# Patient Record
Sex: FEMALE | Race: WHITE | NOT HISPANIC OR LATINO | Employment: FULL TIME | URBAN - METROPOLITAN AREA
[De-identification: names, ages, dates, MRNs, and addresses within clinical notes are randomized per-mention and may not be internally consistent; named-entity substitution may affect disease eponyms.]

---

## 2018-04-27 ENCOUNTER — OFFICE VISIT (OUTPATIENT)
Dept: FAMILY MEDICINE | Facility: CLINIC | Age: 38
End: 2018-04-27
Payer: COMMERCIAL

## 2018-04-27 VITALS
OXYGEN SATURATION: 95 % | BODY MASS INDEX: 28.25 KG/M2 | HEART RATE: 67 BPM | HEIGHT: 67 IN | DIASTOLIC BLOOD PRESSURE: 80 MMHG | TEMPERATURE: 98.8 F | SYSTOLIC BLOOD PRESSURE: 133 MMHG | WEIGHT: 180 LBS

## 2018-04-27 DIAGNOSIS — Z00.00 ANNUAL PHYSICAL EXAM: Primary | ICD-10-CM

## 2018-04-27 DIAGNOSIS — Z13.29 SCREENING FOR THYROID DISORDER: ICD-10-CM

## 2018-04-27 DIAGNOSIS — Z13.1 SCREENING FOR DIABETES MELLITUS: ICD-10-CM

## 2018-04-27 DIAGNOSIS — R60.0 PEDAL EDEMA: ICD-10-CM

## 2018-04-27 DIAGNOSIS — F42.8 OTHER OBSESSIVE-COMPULSIVE DISORDERS: ICD-10-CM

## 2018-04-27 DIAGNOSIS — Z13.220 SCREENING FOR CHOLESTEROL LEVEL: ICD-10-CM

## 2018-04-27 LAB
BASOPHILS # BLD AUTO: 0 10E9/L (ref 0–0.2)
BASOPHILS NFR BLD AUTO: 0.5 %
BUN SERPL-MCNC: 9 MG/DL (ref 5–24)
CALCIUM SERPL-MCNC: 8.3 MG/DL (ref 8.5–10.4)
CHLORIDE SERPLBLD-SCNC: 102 MMOL/L (ref 94–109)
CO2 SERPL-SCNC: 26 MMOL/L (ref 20–32)
CREAT SERPL-MCNC: 0.9 MG/DL (ref 0.6–1.3)
DIFFERENTIAL METHOD BLD: NORMAL
EGFR CALCULATED (BLACK REFERENCE): 89.6
EGFR CALCULATED (NON BLACK REFERENCE): 74.1
EOSINOPHIL # BLD AUTO: 0.1 10E9/L (ref 0–0.7)
EOSINOPHIL NFR BLD AUTO: 0.9 %
ERYTHROCYTE [DISTWIDTH] IN BLOOD BY AUTOMATED COUNT: 11.9 % (ref 10–15)
GLUCOSE SERPL-MCNC: 94 MG/DL (ref 60–109)
HCT VFR BLD AUTO: 43.7 % (ref 35–47)
HGB BLD-MCNC: 14.8 G/DL (ref 11.7–15.7)
IMM GRANULOCYTES # BLD: 0 10E9/L (ref 0–0.4)
IMM GRANULOCYTES NFR BLD: 0.2 %
LYMPHOCYTES # BLD AUTO: 2 10E9/L (ref 0.8–5.3)
LYMPHOCYTES NFR BLD AUTO: 33.3 %
MCH RBC QN AUTO: 32.7 PG (ref 26.5–33)
MCHC RBC AUTO-ENTMCNC: 33.9 G/DL (ref 31.5–36.5)
MCV RBC AUTO: 97 FL (ref 78–100)
MONOCYTES # BLD AUTO: 0.5 10E9/L (ref 0–1.3)
MONOCYTES NFR BLD AUTO: 7.7 %
NEUTROPHILS # BLD AUTO: 3.4 10E9/L (ref 1.6–8.3)
NEUTROPHILS NFR BLD AUTO: 57.4 %
NRBC # BLD AUTO: 0 10*3/UL
NRBC BLD AUTO-RTO: 0 /100
PLATELET # BLD AUTO: 343 10E9/L (ref 150–450)
POTASSIUM SERPL-SCNC: 3.6 MMOL/L (ref 3.4–5.3)
RBC # BLD AUTO: 4.53 10E12/L (ref 3.8–5.2)
SODIUM SERPL-SCNC: 140 MMOL/L (ref 133–144)
TSH SERPL DL<=0.005 MIU/L-ACNC: 4.68 MU/L (ref 0.4–4)
WBC # BLD AUTO: 5.9 10E9/L (ref 4–11)

## 2018-04-27 NOTE — NURSING NOTE
"37 year old  Chief Complaint   Patient presents with     Physical     and pap       Blood pressure 133/80, pulse 67, temperature 98.8  F (37.1  C), temperature source Temporal, height 5' 7.13\" (170.5 cm), weight 180 lb (81.6 kg), last menstrual period 04/20/2018, SpO2 95 %. Body mass index is 28.09 kg/(m^2).  Patient Active Problem List   Diagnosis     OCD (obsessive compulsive disorder)     Eczema       Wt Readings from Last 2 Encounters:   04/27/18 180 lb (81.6 kg)   07/03/17 173 lb 0.6 oz (78.5 kg)     BP Readings from Last 3 Encounters:   04/27/18 133/80   07/03/17 119/76   07/19/16 116/75         Current Outpatient Prescriptions   Medication     FLUOXETINE HCL PO     No current facility-administered medications for this visit.        Social History   Substance Use Topics     Smoking status: Former Smoker     Types: Cigarettes     Smokeless tobacco: Never Used      Comment: very very rarely     Alcohol use 2.5 oz/week     5 Standard drinks or equivalent per week      Comment: occasionally       Health Maintenance Due   Topic Date Due     HIV SCREEN (SYSTEM ASSIGNED)  05/13/1996     PHQ-9 Q1 MONTH  06/17/2016     INFLUENZA VACCINE (1) 09/01/2017       Lab Results   Component Value Date    PAP NIL 07/19/2016       Barbara Rivera MA  April 27, 2018 2:15 PM    "

## 2018-04-27 NOTE — PATIENT INSTRUCTIONS
Dermatology Referral:  [facility]:  [phone]:   [facility] Dermatology: [place] [phone] [Petey Roach  [facility]: Dr. Jeri Ba, [phone]  [facility] Dermatology, [phone] [facility] today

## 2018-04-27 NOTE — PROGRESS NOTES
"Katty is a 37 year old female  that presents today for Annual Exam: previously has been seen at [facility] however this is my visit with patient.  HPI:  Chief Concern is:   -  Hx of granuloma angulare - .  New lesion on her left thigh  -  Has noted swelling in low legs that comes and goes for > one year.    -  Hives to possible \"dried apricots\" a few weeks ago and resolved  ROS:  General: worries about swelling in lower legs   Head/Eyes: none  Ears/Nose/Throat: none  Cardiovascular: palitation   Respiratory: none  Gastrointestinal: none  Breast: none  Genitourinary: none  Sexual Function: none  Musculoskeletal: none  Skin mole   Neurological: none  Mental Health anxiety - hand washing, etc.   Endocrine: none  HCM: PAP/HPV negative: 2016;   PROBLEM LIST:    OCD (obsessive compulsive disorder)  Fluoxetine 80 mg from Psychiatrist     Eczema   OB/GYN HISTORY:   Regular menses.  Is considering pregnancy [ would have to have a reversal]  Obstetric History       T0      L0     SAB0   TAB0   Ectopic0   Multiple0   Live Births0       PAST MEDICAL HISTORY:  Past Medical History:   Diagnosis Date     OCD (obsessive compulsive disorder)    Life Style Modifiers:   Tobacco:  reports that she has quit smoking. Her smoking use included Cigarettes. She has never used smokeless tobacco.   Alcohol:  reports that she drinks about 2.5 oz of alcohol per week    Drug use:  reports that she does not use illicit drugs.  Exercise: exercises 6 times a week                  Diet:   PAST SURGICAL HISTORY:  Past Surgical History:   Procedure Laterality Date     ESOPHAGOSCOPY, GASTROSCOPY, DUODENOSCOPY (EGD), COMBINED  2013    Procedure: COMBINED ESOPHAGOSCOPY, GASTROSCOPY, DUODENOSCOPY (EGD);;  Surgeon: Qasim Kennedy MD;  Location:  GI     EXTRACTION(S) DENTAL     FAMILY HISTORY:  Family History   Problem Relation Age of Onset     Skin Cancer Paternal Grandmother      Not Melanoma     Thyroid Cancer " "Paternal Grandmother      Alzheimer Disease Paternal Grandmother      Bladder Cancer Paternal Grandfather      Advanced     Depression Paternal Grandfather      Hyperlipidemia Father      Unknown/Adopted Mother    SOCIAL HISTORY:  Arnie .  -  X 4 years.  [he has 5 children] Two at home [14 and 17]  Social History     Social History     Marital status:      Spouse name: N/A     Number of children: N/A     Years of education: N/A     Social History Main Topics     Smoking status: Former Smoker     Types: Cigarettes     Smokeless tobacco: Never Used      Comment: very very rarely     Alcohol use 2.5 oz/week     5 Standard drinks or equivalent per week      Comment: occasionally     Drug use: No     Sexual activity: Yes     Partners: Male     Birth control/ protection: Other, None      Comment: Partner had vasectomy     Social History Narrative   MEDICATIONS:  No current outpatient prescriptions on file.   ALLERGIES:  Flagyl [imidazole antifungals] and Keflex [cephalexin monohydrate]  VITALS:  /80 (BP Location: Right arm, Patient Position: Chair, Cuff Size: Adult Regular)  Pulse 67  Temp 98.8  F (37.1  C) (Temporal)  Ht 5' 7.13\" (170.5 cm)  Wt 180 lb (81.6 kg)  LMP 04/20/2018 (Exact Date)  SpO2 95%  BMI 28.09 kg/m2  PHYSICAL EXAM:  Constitutional: Well appearing woman in no acute distress.   Psychological: appropriate mood.  Eyes: anicteric, normal extra-ocular movements,  pupils are equal and reactive to light.   Ears, Nose and Throat: tympanic membranes clear,   Neck: No thyroidmegaly. No jugular venous distension, no carotid bruits.  Cardiovascular: regular rate and rhythm, normal S1 and S2, no murmurs, rubs or gallops, peripheral pulses full and symmetric   Respiratory: clear to auscultation, no wheezes or crackles, normal breath sounds.  Breast: Symmetrical without visible distortion or swelling. No masses noted. No nipple inversion, no breast dimpling or puckering. " "Axillary area without masses or lympadenapathy.   Gastrointestinal: positive bowel sounds, nontender, no hepatosplenomegaly, no masses. No guarding or rebound.  Genitourinary: N/A   Musculoskeletal: full range of motion    Skin: scattered mole. Left leg with minimal lump on upper thigh  Neurological: normal gait, no tremor.   Diagnoses and associated orders for this visit:  Annual physical exam        -     PAP/HPV 7/2016 completed        -     TDAP 7/2016             -     Hives from unknown source \"dried apricots\"        -     Advised dermatology consult with early screen  Pedal edema  -     CBC with platelets differential  -     TEDS and discussed minimizing sitting  -     Cardiac work-up if not resolving  Other obsessive-compulsive disorders        -     Fluoxetine 80 mg   Screening for thyroid disorder  -     TSH  Screening for diabetes mellitus  -     Basic Metabolic Panel ([facility])  Screening for cholesterol level  -     Lipid Panel ([facility])  Other orders  -     FLUOXETINE HCL PO; Take 80 mg by mouth      "

## 2018-04-27 NOTE — MR AVS SNAPSHOT
After Visit Summary   4/27/2018    Katty Pavon    MRN: [id number]           Patient Information     Date Of Birth          1980        Visit Information        Provider Department      4/27/2018 2:20 PM Jessy Tillman MD [facility]        Today's Diagnoses     Annual physical exam    -  1    Pedal edema        Other obsessive-compulsive disorders        Screening for thyroid disorder        Screening for diabetes mellitus        Screening for cholesterol level          Care Instructions    Dermatology Referral:  [facility]:  [phone]:   [facility] Dermatology: [place] [phone] [Petey Roach  [facility]: Dr. Jeri Ba, [phone]  [facility] Dermatology, [phone] [facility] today            Follow-ups after your visit        Who to contact     Please call your clinic at [phone] to:    Ask questions about your health    Make or cancel appointments    Discuss your medicines    Learn about your test results    Speak to your doctor            Additional Information About Your Visit        MyChart Information     [email or web address] gives you secure access to your electronic health record. If you see a primary care provider, you can also send messages to your care team and make appointments. If you have questions, please call your primary care clinic.  If you do not have a primary care provider, please call [phone] and they will assist you.      [email or web address] is an electronic gateway that provides easy, online access to your medical records. With [email or web address], you can request a clinic appointment, read your test results, renew a prescription or communicate with your care team.     To access your existing account, please contact your [facility] or call [phone] for assistance.        Care EveryWhere ID     This is your Care EveryWhere ID. This could be used by other organizations to access your [facility]  "records  [id number]        Your Vitals Were     Pulse Temperature Height Last Period Pulse Oximetry BMI (Body Mass Index)    67 98.8  F (37.1  C) (Temporal) 5' 7.13\" (170.5 cm) 04/20/2018 (Exact Date) 95% 28.09 kg/m2       Blood Pressure from Last 3 Encounters:   04/27/18 133/80   07/03/17 119/76   07/19/16 116/75    Weight from Last 3 Encounters:   04/27/18 180 lb (81.6 kg)   07/03/17 173 lb 0.6 oz (78.5 kg)   07/19/16 167 lb (75.8 kg)              We Performed the Following     Basic Metabolic Panel ([facility])     CBC with platelets differential     Lipid Panel ([facility])     [facility]        Primary Care Provider Office Phone # Fax #    Ricardo Arnold [phone]       [address]  [facility] [address]        Equal Access to Services     LOYDA MOLINA : Hadii aad ku hadasho Soomaali, waaxda luqadaha, qaybta kaalmada adeegyada, anahi titus . So [facility] [phone].    ATENCIÓN: Si tyler trimble, tiene a matute disposición servicios gratuitos de asistencia lingüística. Llame al [phone].    We comply with applicable federal civil rights laws and [place] laws. We do not discriminate on the basis of race, color, national origin, age, disability, sex, sexual orientation, or gender identity.            Thank you!     Thank you for choosing [facility]  for your care. Our goal is always to provide you with excellent care. Hearing back from our patients is one way we can continue to improve our services. Please take a few minutes to complete the written survey that you may receive in the mail after your visit with us. Thank you!             Your Updated Medication List - Protect others around you: Learn how to safely use, store and throw away your medicines at www.disposemymeds.org.          This list is accurate as of 4/27/18  3:50 PM.  Always use your most recent med list.                   Brand Name Dispense Instructions for use Diagnosis    FLUOXETINE HCL PO      Take " 80 mg by mouth

## 2018-04-30 LAB
CHOLEST SERPL-MCNC: 271 MG/DL (ref 0–200)
CHOLEST/HDLC SERPL: 4.2 {RATIO} (ref 0–5)
FASTING SPECIMEN: NO
HDLC SERPL-MCNC: 65 MG/DL
LDLC SERPL CALC-MCNC: 167 MG/DL (ref 0–129)
TRIGL SERPL-MCNC: 197 MG/DL (ref 0–150)
VLDL-CHOLESTEROL: 39 (ref 7–32)

## 2018-05-02 DIAGNOSIS — R79.89 ELEVATED TSH: Primary | ICD-10-CM

## 2018-10-29 DIAGNOSIS — R79.89 ELEVATED TSH: ICD-10-CM

## 2018-10-29 LAB
T3FREE SERPL-MCNC: 2.5 PG/ML (ref 2.3–4.2)
T4 FREE SERPL-MCNC: 0.94 NG/DL (ref 0.76–1.46)
TSH SERPL DL<=0.005 MIU/L-ACNC: 6.53 MU/L (ref 0.4–4)

## 2019-10-03 ENCOUNTER — HEALTH MAINTENANCE LETTER (OUTPATIENT)
Age: 39
End: 2019-10-03

## 2019-10-23 ENCOUNTER — TELEPHONE (OUTPATIENT)
Dept: FAMILY MEDICINE | Facility: CLINIC | Age: 39
End: 2019-10-23

## 2019-10-23 NOTE — TELEPHONE ENCOUNTER
Called patient and informed her of last Tdap:    Most Recent Immunizations   Administered Date(s) Administered     TDAP Vaccine (Boostrix) 07/19/2016     She stepped on a abner nail at her home and it did not break the skin but bruised some. No other concerns, all questions answered.     Erin Arciniega RN  10/23/19  10:56 AM

## 2019-10-23 NOTE — TELEPHONE ENCOUNTER
[facility] Call Center    Phone Message    May a detailed message be left on voicemail: yes    Reason for Call: Symptoms or Concerns     If patient has red-flag symptoms, warm transfer to triage line    Current symptom or concern: patient called because she stepped on a nail and is wanting to know when her last tetanus was and if she needs to come in           Action Taken: Message routed to:  [facility]: ARMANI
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
## 2019-12-19 ENCOUNTER — OFFICE VISIT (OUTPATIENT)
Dept: FAMILY MEDICINE | Facility: CLINIC | Age: 39
End: 2019-12-19
Payer: COMMERCIAL

## 2019-12-19 VITALS
DIASTOLIC BLOOD PRESSURE: 77 MMHG | HEART RATE: 60 BPM | OXYGEN SATURATION: 98 % | TEMPERATURE: 97.9 F | SYSTOLIC BLOOD PRESSURE: 116 MMHG | BODY MASS INDEX: 27.78 KG/M2 | RESPIRATION RATE: 16 BRPM | HEIGHT: 67 IN | WEIGHT: 177 LBS

## 2019-12-19 DIAGNOSIS — Z11.3 SCREENING FOR STD (SEXUALLY TRANSMITTED DISEASE): ICD-10-CM

## 2019-12-19 DIAGNOSIS — Z23 FLU VACCINE NEED: ICD-10-CM

## 2019-12-19 DIAGNOSIS — Z00.00 ROUTINE GENERAL MEDICAL EXAMINATION AT A HEALTH CARE FACILITY: Primary | ICD-10-CM

## 2019-12-19 DIAGNOSIS — Z13.220 SCREENING FOR LIPID DISORDERS: ICD-10-CM

## 2019-12-19 DIAGNOSIS — F33.1 MODERATE EPISODE OF RECURRENT MAJOR DEPRESSIVE DISORDER (H): ICD-10-CM

## 2019-12-19 SDOH — HEALTH STABILITY: MENTAL HEALTH: HOW MANY STANDARD DRINKS CONTAINING ALCOHOL DO YOU HAVE ON A TYPICAL DAY?: 1 OR 2

## 2019-12-19 SDOH — HEALTH STABILITY: PHYSICAL HEALTH: ON AVERAGE, HOW MANY MINUTES DO YOU ENGAGE IN EXERCISE AT THIS LEVEL?: 0 MIN

## 2019-12-19 SDOH — HEALTH STABILITY: MENTAL HEALTH
STRESS IS WHEN SOMEONE FEELS TENSE, NERVOUS, ANXIOUS, OR CAN'T SLEEP AT NIGHT BECAUSE THEIR MIND IS TROUBLED. HOW STRESSED ARE YOU?: RATHER MUCH

## 2019-12-19 SDOH — HEALTH STABILITY: PHYSICAL HEALTH: ON AVERAGE, HOW MANY DAYS PER WEEK DO YOU ENGAGE IN MODERATE TO STRENUOUS EXERCISE (LIKE A BRISK WALK)?: 0 DAYS

## 2019-12-19 SDOH — HEALTH STABILITY: MENTAL HEALTH: HOW OFTEN DO YOU HAVE A DRINK CONTAINING ALCOHOL?: 2-3 TIMES A WEEK

## 2019-12-19 SDOH — HEALTH STABILITY: MENTAL HEALTH: HOW OFTEN DO YOU HAVE 6 OR MORE DRINKS ON ONE OCCASION?: NEVER

## 2019-12-19 ASSESSMENT — ANXIETY QUESTIONNAIRES
5. BEING SO RESTLESS THAT IT IS HARD TO SIT STILL: NOT AT ALL
6. BECOMING EASILY ANNOYED OR IRRITABLE: SEVERAL DAYS
7. FEELING AFRAID AS IF SOMETHING AWFUL MIGHT HAPPEN: NOT AT ALL
1. FEELING NERVOUS, ANXIOUS, OR ON EDGE: MORE THAN HALF THE DAYS
IF YOU CHECKED OFF ANY PROBLEMS ON THIS QUESTIONNAIRE, HOW DIFFICULT HAVE THESE PROBLEMS MADE IT FOR YOU TO DO YOUR WORK, TAKE CARE OF THINGS AT HOME, OR GET ALONG WITH OTHER PEOPLE: EXTREMELY DIFFICULT
3. WORRYING TOO MUCH ABOUT DIFFERENT THINGS: NEARLY EVERY DAY
GAD7 TOTAL SCORE: 11
2. NOT BEING ABLE TO STOP OR CONTROL WORRYING: NEARLY EVERY DAY

## 2019-12-19 ASSESSMENT — MIFFLIN-ST. JEOR: SCORE: 1505.62

## 2019-12-19 ASSESSMENT — PATIENT HEALTH QUESTIONNAIRE - PHQ9
5. POOR APPETITE OR OVEREATING: MORE THAN HALF THE DAYS
SUM OF ALL RESPONSES TO PHQ QUESTIONS 1-9: 14

## 2019-12-19 NOTE — NURSING NOTE
"39 year old  Chief Complaint   Patient presents with     Physical     pt has a bump on her left upper thigh for years she would like to have looked.       Blood pressure 116/77, pulse 60, temperature 97.9  F (36.6  C), temperature source Oral, resp. rate 16, height 1.71 m (5' 7.32\"), weight 80.3 kg (177 lb), last menstrual period 12/09/2019, SpO2 98 %, not currently breastfeeding. Body mass index is 27.46 kg/m .  Patient Active Problem List   Diagnosis     OCD (obsessive compulsive disorder)     Eczema       Wt Readings from Last 2 Encounters:   12/19/19 80.3 kg (177 lb)   04/27/18 81.6 kg (180 lb)     BP Readings from Last 3 Encounters:   12/19/19 116/77   04/27/18 133/80   07/03/17 119/76         Current Outpatient Medications   Medication     VITAMIN D PO     FLUOXETINE HCL PO     No current facility-administered medications for this visit.        Social History     Tobacco Use     Smoking status: Former Smoker     Types: Cigarettes     Smokeless tobacco: Never Used     Tobacco comment: very very rarely   Substance Use Topics     Alcohol use: Yes     Alcohol/week: 4.2 standard drinks     Types: 5 Standard drinks or equivalent per week     Comment: 5 drinks a week     Drug use: No       Health Maintenance Due   Topic Date Due     HIV SCREENING  05/13/1993     PHQ-9  06/17/2016     PHQ-2  01/01/2019       Lab Results   Component Value Date    PAP NIL 07/19/2016 December 19, 2019 1:18 PM  " No pertinent past medical history

## 2019-12-20 ASSESSMENT — ANXIETY QUESTIONNAIRES: GAD7 TOTAL SCORE: 11

## 2019-12-27 PROBLEM — F33.42 RECURRENT MAJOR DEPRESSIVE DISORDER, IN FULL REMISSION (H): Status: ACTIVE | Noted: 2019-12-27

## 2019-12-27 RX ORDER — LANOLIN ALCOHOL/MO/W.PET/CERES
CREAM (GRAM) TOPICAL
COMMUNITY
Start: 2019-01-07 | End: 2021-09-11

## 2020-08-21 ENCOUNTER — TELEPHONE (OUTPATIENT)
Dept: BEHAVIORAL HEALTH | Facility: CLINIC | Age: 40
End: 2020-08-21

## 2020-08-21 NOTE — TELEPHONE ENCOUNTER
8/21/20 Received call from Pt requesting a DA for [facility] [place]. Scheduled for 8/25/20 with Lorraine Haines. NAMRATAT

## 2020-08-25 ENCOUNTER — HOSPITAL ENCOUNTER (OUTPATIENT)
Dept: BEHAVIORAL HEALTH | Facility: CLINIC | Age: 40
Discharge: HOME | End: 2020-08-25
Attending: PSYCHIATRY & NEUROLOGY | Admitting: PSYCHIATRY & NEUROLOGY
Payer: COMMERCIAL

## 2020-08-25 PROCEDURE — 90791 PSYCH DIAGNOSTIC EVALUATION: CPT | Mod: 95 | Performed by: PSYCHOLOGIST

## 2020-08-25 NOTE — TELEPHONE ENCOUNTER
Writer tried to get in touch with patient to check them in for their MH eval today, no answer so a vm was left for patient to return call to check in. Will try to call again.

## 2020-08-25 NOTE — PROGRESS NOTES
"[facility]  Evaluator Name:  Lorraine Haines      Credentials:  PSYD DEBORA    PATIENT'S NAME: Katty Pavon  PREFERRED NAME: Katty  PREFERRED PRONOUNS: She/Her/Herself  MRN:   [id number]  :   1980   ACCT. NUMBER: [id number]  DATE OF SERVICE: 20  START TIME: 1330  END TIME: 1500  PREFERRED PHONE: [phone]  May we leave a program related message: Yes  Service Modality:  Video Visit:    Telemedicine Visit: The patient's condition can be safely assessed and treated via synchronous audio and visual telemedicine encounter.      Reason for Telemedicine Visit: Services only offered telehealth    Originating Site (Patient Location): Patient's home    Distant Site (Provider Location): Provider Remote Setting    Consent:  The patient/guardian has verbally consented to: the potential risks and benefits of telemedicine (video visit) versus in person care; bill my insurance or make self-payment for services provided; and responsibility for payment of non-covered services.     Patient would like the video invitation sent by: Send to e-mail at: [email or web address]}     Mode of Communication:  Video Conference via Gl[employer]ach    As the provider I attest to compliance with applicable laws and regulations related to telemedicine.    STANDARD ADULT DIAGNOSTIC ASSESSMENT      Identifying Information:  Patient is a 40 year old, .  The pronoun use throughout this assessment reflects the patient's chosen pronoun.  Patient was referred for an assessment by self.  Patient attended the session alone.     Chief Complaint:   The reason for seeking services at this time is: \"OCD, for contamination \" She said the uptick in OCD symptoms is making anxiety worse and she is more depressed.  The problem(s) began lifelong OCD, urine and feces contamination about being less than or dirty.  She said it has been worse since 2019.  She is not afraid of COVID and does not believe it is making the OCD " "worse. She said she has looked for specific OCD treatment and everyone has a long wait list.  She said she contacted [facility] whose wait is 6-12 weeks, individual providers in the [facility] who specialize in OCD and their not taking new patients.   Patient has attempted to resolve these concerns in the past through Dr. Romano at [facility].  She has a therapist and psychiatry.    She said she and  have done therapy together as well.    Social/Family History:  Patient reported they grew up in [place], [place].  Parents  when she was 3 and they  when patient was 6 years old.  She said both parents remarried but remained good friends. They were raised by biological parents.  She has one sister. Mother has OCD but has never gotten help. She said \"I lived in a museum\".  She said her mother is a therapist.  She said her father has NAOMI and is being treated.   Patient reported that her childhood; she modulated for her parents. She had to manage mom's OCD.  Patient described their current relationships with family of origin as good with parents now. She said she talks to them regularly.  9,11,15,31, 42 depressive episodes    The patient describes their cultural background as .  Cultural influences and impact on patient's life structure, values, norms, and healthcare: Racial or Ethnic Self-Identification Orthodoxy but agnostic.  Contextual influences on patient's health include: Family Factors intense, high expectations.    These factors will be addressed in the Preliminary Treatment plan.  Patient identified their preferred language to be English. Patient reported they does not need the assistance of an  or other support involved in therapy.     Patient reported had no significant delays in developmental tasks.   Patient's highest education level was graduate school. Law School  Patient identified the following learning problems: none reported.  " Modifications will not be used to assist communication in therapy.   Patient reports they are  able to understand written materials.    Patient reported the following relationship history  1x  Patient's current relationship status is  for 7 years.  was  1x prior.  She said the marrriage is stressful due to all of these issues with exwife.  She said  has his own issues with mental health and substance use. She said they had 10 years of custody battles and excessive litigation on exwife's part.  She said they did get custody of kids 5 years ago.  She said she recently empty nested out of raising the kids.   Patient identified their sexual orientation as heterosexual.  Patient reported having 5 stepchildren. Patient identified partner, parents, friends, therapist and co-worker as part of their support system.  Patient identified the quality of these relationships as stable and meaningful.      Patient's current living/housing situation involves staying in own home/apartment.  They live with  and they report that housing is stable.     Patient is currently employed full time and reports they are able to function appropriately at work.. She said she can be functional but it is very difficult.  She will not take care of herself to get her work done.  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   Patient reports their finances are obtained through employment.  She said her  is a massage therapist and his work has suffered due to COVID.  Patient does not identify finances as a current stressor.  She said they just recently got out of financial stress due to custody litigation.    Patient reported that they have been involved with the legal system.  Custody litigation with 's exwife because she files for more support.  Patient denies being on probation / parole / under the jurisdiction of the court.      Patient's Strengths and  Limitations:  Patient identified the following strengths or resources that will help them succeed in treatment: commitment to health and well being, friends / good social support, family support, insight and intelligence. Things that may interfere with the patient's success in treatment include: work.   _______________________________________________  Personal and Family Medical History:   Patient did not report a family history of mental health concerns.  Patient reports family history includes Alzheimer Disease in her paternal grandmother; Anxiety Disorder in her father; Bladder Cancer in her paternal grandfather; Depression in her paternal grandfather; Hyperlipidemia in her father; Obesity in her father; Skin Cancer in her paternal grandmother; Thyroid Cancer in her paternal grandmother; Unknown/Adopted in her mother..     Patient reported the following previous diagnoses which include(s): an Anxiety Disorder, Depression and Obsessive Compulsive Disorder.  Patient reported symptoms began childhood.   Patient has received mental health services in the past: psychiatry with Walter Romano. .  Psychiatric Hospitalizations: None.  Patient denies a history of civil commitment.  Currently, patient is receiving other mental health services.  These include psychotherapy with Anneliese Trujillo and psychiatry with Dr. Griffith.  Next appointment: 2 weeks.   Patient has had a physical exam to rule out medical causes for current symptoms.  Date of last physical exam was within the past year. Client was encouraged to follow up with PCP if symptoms were to develop. The patient has a non-[facility] Primary Care Provider. Their PCP is [facility].  MATILDE Chavira..  Patient reports no current medical concerns.  There are significant appetite / nutritional concerns / weight changes. She said she has a weak stomach as a function of her anxiety.  Patient does not report a history of head injury / trauma / cognitive impairment.       Patient reports current meds as:   Outpatient Medications Marked as Taking for the 8/25/20 encounter (Hospital Encounter) with LorraineYancy LP   Medication Sig     ALPRAZolam (XANAX) 1 MG tablet Take 1 mg by mouth 3 times daily as needed for anxiety     fluvoxaMINE (LUVOX) 50 MG tablet Take 50 mg by mouth At Bedtime     VITAMIN D PO Take 2,000 Units by mouth       Medication Adherence:  Patient reports taking prescribed medications as prescribed.    Patient Allergies:    Allergies   Allergen Reactions     Apricot Flavor Hives     Flagyl [Metronidazole] Hives     Keflex [Cephalexin Monohydrate] Hives       Medical History:    Past Medical History:   Diagnosis Date     Anxiety      Depression      OCD (obsessive compulsive disorder)          Current Mental Status Exam:   Appearance:  Appropriate    Eye Contact:  Good   Psychomotor:  Normal       Gait / station:  no problem  Attitude / Demeanor: Cooperative   Speech      Rate / Production: Normal/ Responsive      Volume:  Normal  volume      Language:  no problems  Mood:   Anxious   Affect:   Worrisome    Thought Content: Clear   Thought Process: Coherent  Goal Directed       Associations: No loosening of associations  Insight:   Good   Judgment:  Intact   Orientation:  All  Attention/concentration: Good    Rating Scales:    PHQ9:    PHQ-9 SCORE 5/17/2016 12/19/2019 8/25/2020   PHQ-9 Total Score - - -   PHQ-9 Total Score MyChart - - 19 (Moderately severe depression)   PHQ-9 Total Score 14 14 19   ;    GAD7:    NAOMI-7 SCORE 5/17/2016 12/19/2019 8/25/2020   Total Score - - 14 (moderate anxiety)   Total Score 9 11 14     CGI:     First:Considering your total clinical experience with this particular patient population, how severe are the patient's symptoms at this time?: 5 (8/25/2020  1:39 PM)  ;    Most recentCompared to the patient's condition at the START of treatment, this patient's condition is: 4 (8/25/2020  1:39 PM)      Substance Use:  Patient did not report  a family history of substance use concerns; see medical history section for details.  Patient has not received chemical dependency treatment in the past.  Patient has not ever been to detox.      Patient is not currently receiving any chemical dependency treatment. Patient reported the following problems as a result of their substance use: none identified.    Patient reports occasional use of alcohol.  She said she does not abuse alcohol because it makes her stomach worse which feeds into the OCD.  Patient denies using tobacco.  Patient denies using marijuana.  Patient reports she drinks 1-2 cups of coffe, stops at noon  Patient reports using/abusing the following substance(s). Patient reported no other substance use.     CAGE- AID:    CAGE-AID Total Score 12/6/2013   Total Score 0   1 No  2 No  3. No  4. No    Substance Use: No symptoms    Based on the negative CAGE score and clinical interview there  are not indications of drug or alcohol abuse.    Significant Losses / Trauma / Abuse / Neglect Issues:   Patient did not serve in the .  There are indications or report of significant loss, trauma, abuse or neglect issues related to:  She reports no direct trauma.  She said that she had problems raising her stepchildren and feels she had secondary trauma due to their issues.   Concerns for possible neglect are not present.     Safety Assessment:  She said at times she sees no point in life but would never hurt herself.  She said she has no thoughts or plans to hurt herself.  No previous attempts  Current Safety Concerns:  [place] Suicide Severity Rating Scale (Short Version)  [place] Suicide Severity Rating (Short Version) 8/25/2020   Over the past 2 weeks have you felt down, depressed, or hopeless? no   Over the past 2 weeks have you had thoughts of killing yourself? no   Have you ever attempted to kill yourself? no     Patient denies current homicidal ideation and behaviors.  Patient denies current  self-injurious ideation and behaviors.    Patient denied risk behaviors associated with substance use.  Patient denies any high risk behaviors associated with mental health symptoms.  Patient reports the following current concerns for their personal safety: None.  Patient reports there are no  firearms in the house.     History of Safety Concerns:  Patient denied a history of homicidal ideation.     Patient denied a history of personal safety concerns.    Patient denied a history of assaultive behaviors.    Patient denied a history of sexual assault behaviors.     Patient denied a history of risk behaviors associated with substance use.  Patient denies any history of high risk behaviors associated with mental health symptoms.  Patient reports the following protective factors: dedication to family/friends, safe and stable environment, secure attachment, adherence with prescribed medication, living with other people and daily obligations    Risk Plan:  See Preliminary Treatment Plan for Safety and Risk Management Plan    Review of Symptoms per patient report:  Depression: Change in sleep, Change in energy level, Difficulties concentrating, Feelings of hopelessness, Feelings of helplessness, Low self-worth, Ruminations and Feeling sad, down, or depressed  Sihla:  No Symptoms  Psychosis: No Symptoms  Anxiety: Excessive worry, Nervousness, Physical complaints, such as headaches, stomachaches, muscle tension, Ruminations, Poor concentration and Irritability  Panic:  has had panic attacks.  Wakies up every morning with palpitations and feeling hot.  She said she has had times when she has had panic attacks; not feeling grounded cannot feel her body.  Post Traumatic Stress Disorder:  No Symptoms   Eating Disorder: No Symptoms  ADD / ADHD:  No symptoms  Conduct Disorder: No symptoms  Autism Spectrum Disorder: No symptoms  Obsessive Compulsive Disorder: contamination fears, must shower,  spending approximately 3 hours per  day.  Hand washing, showering  contamination limited to urine and feces  She said she is avoiding things that may require her to take a shower after because she has a hard time getting out of the shower.    Patient reports the following compulsive behaviors and treatment history: none identified.      Diagnostic Criteria:   A. Excessive anxiety and worry about a number of events or activities (such as work or school performance).   B. The person finds it difficult to control the worry.  C. Select 3 or more symptoms (required for diagnosis). Only one item is required in children.   - Being easily fatigued.    - Difficulty concentrating or mind going blank.    - Irritability.    - Muscle tension.    - Sleep disturbance (difficulty falling or staying asleep, or restless unsatisfying sleep).   D. The focus of the anxiety and worry is not confined to features of an Axis I disorder.  E. The anxiety, worry, or physical symptoms cause clinically significant distress or impairment in social, occupational, or other important areas of functioning.   F. The disturbance is not due to the direct physiological effects of a substance (e.g., a drug of abuse, a medication) or a general medical condition (e.g., hyperthyroidism) and does not occur exclusively during a Mood Disorder, a Psychotic Disorder, or a Pervasive Developmental Disorder.  CRITERIA (A-C) REPRESENT A MAJOR DEPRESSIVE EPISODE - SELECT THESE CRITERIA  A) Single episode - symptoms have been present during the same 2-week period and represent a change from previous functioning 5 or more symptoms (required for diagnosis)   - Depressed mood. Note: In children and adolescents, can be irritable mood.     - Increased sleep.    - Psychomotor activity agitation.    - Fatigue or loss of energy.    - Feelings of worthlessness or inappropriate guilt.    - Diminished ability to think or concentrate, or indecisiveness.   B) The symptoms cause clinically significant distress or  impairment in social, occupational, or other important areas of functioning  C) The episode is not attributable to the physiological effects of a substance or to another medical condition  D) The occurence of major depressive episode is not better explained by other thought / psychotic disorders  E) There has never been a manic episode or hypomanic episode    (1) recurrent and persistent thoughts, impulses, or images that are experienced, at some time during the disturbance, as intrusive and inappropriate and that cause marked anxiety or distress     (2) the thoughts, impulses, or images are not simply excessive worries about real-life problems     (3) the client attempts to ignore or suppress such thoughts, impulses, or images, or to neutralize them with some other thought or action     (4) the client recognizes that the obsessional thoughts, impulses, or images are a product of his or her own mind (not imposed from without as in thought insertion)     (1) repetitive behaviors (e.g., hand washing, ordering, checking) or mental acts (e.g., praying, counting, repeating words silently) that the person feels driven to perform in response to an obsession, or according to rules that must be applied rigidly     (2) the behaviors or mental acts are aimed at preventing or reducing distress or preventing some dreaded event or situation; however, these behaviors or mental acts either are not connected in a realistic way with what they are designed to neutralize or prevent or are clearly excessive   At some point during the course of the disorder, the person has recognized that the obsessions or compulsions are excessive or unreasonable  The obsessions or compulsions cause marked distress, are time consuming (take more than 1 hour a day), or significantly interfere with the person's normal routine, occupational (or academic) functioning, or usual social activities or relationships.   The content of the obsessions or  compulsions are not restricted to another Axis I Disorder (e.g., preoccupation with food in the presence of an Eating Disorders; hair pulling in the presence of Trichotillomania; concern with appearance in the presence of Body Dysmorphic Disorder; preoccupation with drugs in the presence of a Substance Use Disorder; preoccupation with having a serious illness in the presence of Hypochondriasis; preoccupation with sexual urges or fantasies in the presence of a Paraphilia; or guilty ruminations in the presence of Major Depressive Disorder).   The disturbance is not due to the direct physiological effects of a substance (e.g., a drug of abuse, a medication) or a general medical condition    Functional Status:  Patient reports the following functional impairments: chronic disease management, health maintenance, management of the household and or completion of tasks, self-care and social interactions.     WHODAS:   WHODAS 2.0 Total Score 2020   Total Score 22       LOCUS Worksheet     Name: Katty Pavon MRN: [id number]    : 1980      Gender:  female    PMI:  HP   Provider Name: [facility]   Provider NPI:  [id number]    Actual level of Care Provided:  Outpatient therapy and psychiatry    Service(s) receiving or referred to:  Adult Day Treatment    Reason for Variance: increase in symptom distress      Rating completed by: Lorraine Haines PSYD, LP      I. Risk of Harm:   1      Minimal Risk of Harm    II. Functional Status:   4      Serious Impairment    III. Co-Morbidity:   1      No Co-Morbidity    IV - A. Recovery Environment - Level of Stress:   3      Moderately Stress Environment    IV - B. Recovery Environment - Level of Support:   3      Limited Support in Environment    V. Treatment and Recovery History:   4      Poor Response to Treatment and Recovery Management    VI. Engagement and Recovery Project:   2      Positive Engagement and Recovery       18 Composite Score    Level of Care  Recommendation:   17 to 19       High Intensity Community Based Services    Clinical Summary:  1. Reason for assessment: Patient reports increased OCD symptoms increasing anxiety and depression symptoms. She is looking for more support and structure while working to obtain more specific OCD treatment .  2. Psychosocial, Cultural and Contextual Factors: Patient reports she is Hindu but agnostic.  She said her clifford's wife is continually litigious and they have been in court every few months regarding custody or support.  She tends to appear that she is functioning well even when symptoms are high .  3. Principal DSM5 Diagnoses  (Sustained by DSM5 Criteria Listed Above):   300.02 (F41.1) Generalized Anxiety Disorder  300.3 (F42) Obsessive Compulsive Disorder.  4. Other Diagnoses that is relevant to services:   None current  5. Provisional Diagnosis:  296.22 (F32.1)  Major Depressive Disorder, Single Episode, Moderate _ and With anxious distress as evidenced by depression symptoms started 2 weeks ago, may be more a function of anxiety and OCD rather than a separate diagnoses.  Further assessment of symptoms during treatment would clarify diagnosis.  6. Prognosis: Expect Improvement.  7. Likely consequences of symptoms if not treated: Without treatment patient more than likely will experience a continuation of symptoms with decreased daily functioning, requiring an increased level of care.  8. Client strengths include:  creative, educated, employed, has a previous history of therapy, intelligent, motivated, open to learning and open to suggestions / feedback .     Recommendations:     1. Plan for Safety and Risk Management:Recommended that patient call 911 or go to the local ED should there be a change in any of these risk factors..  Report to child / adult protection services was NA.     2. Patient identified no cultural or spiritual concerns for treatment services. Patient encouraged to ask for help should needs  arise in the course of treatment.      3. Initial Treatment will focus on: Anxiety - structure and problem solving to lessen mood symptoms.     4. Resources/Service Plan:       services are not indicated.     Modifications to assist communication are not indicated.     Additional disability accommodations are not indicated.      5. Collaboration:  Collaboration / coordination of treatment will be initiated with the following support professionals: outpatient therapist and psychiatry. Writer referred to psychiatry clinic for Dr. Ashley and team for specific OCD services.     6.  Referrals:  The following referral(s) will be initiated: Day Treatment. Next Scheduled Appointment: 09.03.20.  A Release of Information has been obtained for the following: outpatient therapist, psychiatry and emergency contact.    7. GABRIELA: GABRIELA:  Discussed the general effects of drugs and alcohol on health and well-being.     8. Records were not available for review at time of assessment.  Information in this assessment was obtained from the medical record and provided by patient who is a good historian.   Patient will have open access to their mental health medical record.      Eval type:  Mental Health    Staff Name/Credentials:  Lorraine Haines PSYD, LP  August 25, 2020

## 2020-08-26 RX ORDER — ALPRAZOLAM 1 MG
1 TABLET ORAL 3 TIMES DAILY PRN
COMMUNITY
End: 2021-09-11

## 2020-08-26 RX ORDER — FLUVOXAMINE MALEATE 50 MG
200 TABLET ORAL AT BEDTIME
COMMUNITY
End: 2021-09-11

## 2020-09-02 NOTE — PROGRESS NOTES
Pt called and said she would not be coming to the outpatient program.  She said she was able to leave a message for the psychiatry clinic and is on a wait list for [facility].  She said she did talk to her therapist and has decided for now that she will pursue the other options.  If needed in the future she will call back for admittance to day treatment.   Lorraine Haines PSYD, LP

## 2020-09-04 ENCOUNTER — OFFICE VISIT (OUTPATIENT)
Dept: PSYCHIATRY | Facility: CLINIC | Age: 40
End: 2020-09-04
Payer: COMMERCIAL

## 2020-09-04 DIAGNOSIS — F42.9 OBSESSIVE-COMPULSIVE DISORDER, UNSPECIFIED TYPE: Primary | ICD-10-CM

## 2020-09-04 DIAGNOSIS — F42.2 MIXED OBSESSIONAL THOUGHTS AND ACTS: ICD-10-CM

## 2020-09-11 ENCOUNTER — VIRTUAL VISIT (OUTPATIENT)
Dept: PSYCHIATRY | Facility: CLINIC | Age: 40
End: 2020-09-11
Attending: PSYCHOLOGIST
Payer: COMMERCIAL

## 2020-09-11 DIAGNOSIS — F42.2 MIXED OBSESSIONAL THOUGHTS AND ACTS: Primary | ICD-10-CM

## 2020-09-11 DIAGNOSIS — F42.2 MIXED OBSESSIONAL THOUGHTS AND ACTS: ICD-10-CM

## 2020-09-13 NOTE — PROGRESS NOTES
"OUTPATIENT PSYCHOTHERAPY PROGRESS NOTE    Client Name: Katty Pavon   YOB: 1980 (40 year old)   Date of Service:  Sep 11, 2020  Time of Service: 2:00 PM to 3:07PM (67 minutes)  Service Type(s): Therapy     Type of service: Telemedicine Psychotherapy  Reason for Telemedicine Visit: COVID-19 public health recommendations on in-person sessions  Mode of transmission: Secure real time interactive audio and visual telecommunication system (videoconference via Zoom)  Location of originating and distant sites:    Originating site (patient location): patient home    Distant site (provider site): HIPAA compliant location within provider home/remote setting  Telemedicine Visit: The patient's condition can be safely assessed and treated via synchronous audio and visual telemedicine encounter.    Patient has agreed to receiving services via telemedicine technology.    Diagnoses:   Encounter Diagnoses   Name Primary?     OCD (obsessive compulsive disorder)      Mixed obsessional thoughts and acts Yes       Individuals Present:   Patient attended alone    Treatment goal(s) being addressed:   To reduce obsessions surrounding fears of contamination from urine and feces that lead to pronounced cleaning rituals. In addition, the patient has frequent bouts of anxiety each morning that are accompanied by heart palpitations. While these are often the result of lingering anxieties about showering or other contamination related fears, the patient still has these \"anxiety attacks\" even when there isn't something she is explicitly anxious about.     Subjective:    The patient stated that her luvox has been helping with her anxiety, noting that it may just be a placebo effect. She noted that her contamination anxieties had been triggered the previous Saturday by a young boy that frequently defecates in his pants. Specifically, the boy sat on her couch, and while she knew that he had not had an accident that day, the " "patient still had pronounced anxiety and has avoided that couch since the incident occurred. Furthermore, the patient had a long shower (3.5 hours), though she stopped about half way through \"to take a break\". Her  reportedly helped her to calm down. The patient also has a new contamination fear: \"poop bunnies\". These are dust bunnies that are present in her downstairs bathroom, which she fears are contaminated with feces. As a result, she has been actively avoiding the downstairs bathroom. Notably, the patient has been smoking excessively, as much as an entire pack each day.         Treatment:     The patient's ritual log for the past week was reviewed, in addition to the YBOCS she had completed. A detailed accounting of all of her obsessions and rituals was conducted, including those that she no longer endorses (e.g. obsessions surrounding madelin diseases).    Assessment and Progress:     Patient arrived on time to the appointment and was casually dressed and appropriately groomed. She apologized for her appearance - she was wearing sweatpants and a sweatshirt. Attention and concentration were within normal boundaries. Speech was fluent, with normal prosody. Thought content was linear and goal-directed. No indication of unusual beliefs or perception. Patient was forthcoming, pleasant and cooperative. The patient was able to complete her ritual log, in addition to the YBOCS symptom checklist, and symptom severity measure.         Plan:     For homework, the patient was instructed to complete a thought record around each inciting incident of contamination anxiety. Specifically, the patient was told to complete a thought record around using the downstairs bathroom, sitting on her couch and obsessions and rituals  around showering. She was told to continue to fill out a weekly ritual log. She was instructed to use her downstairs bathroom at least once per day, to spend 20 minutes each day sitting on the " area of the couch where the young boy had sat, and to refrain from washing her hands after she smoked a cigarette.         Sheng Gutierrez  Practicum Student

## 2020-09-16 NOTE — PROGRESS NOTES
I (Kole Ashley, Ph.D., ) reviewed this note and agree with its contents.  I was present during the key elements of this evaluation.  This session will be discussed in supervision before being seen here again for psychotherapy.

## 2020-09-16 NOTE — PROGRESS NOTES
I (Kole Ashley, Ph.D., ) reviewed this note and agree with its contents.  I did not staff this session in person.  This session will be discussed in supervision before being seen here again for psychotherapy.

## 2020-09-18 ENCOUNTER — VIRTUAL VISIT (OUTPATIENT)
Dept: PSYCHIATRY | Facility: CLINIC | Age: 40
End: 2020-09-18
Attending: PSYCHOLOGIST
Payer: COMMERCIAL

## 2020-09-18 DIAGNOSIS — F42.9 OBSESSIVE-COMPULSIVE DISORDER, UNSPECIFIED TYPE: Primary | ICD-10-CM

## 2020-09-18 DIAGNOSIS — F42.2 MIXED OBSESSIONAL THOUGHTS AND ACTS: ICD-10-CM

## 2020-09-24 NOTE — PROGRESS NOTES
"OUTPATIENT PSYCHOTHERAPY PROGRESS NOTE    Client Name: Katty Pavon   YOB: 1980 (40 year old)   Date of Service:  Sep 18, 2020  Time of Service: 2:00 PM to 3:04PM (64 minutes)  Service Type(s): Therapy     Type of service: Telemedicine Psychotherapy  Reason for Telemedicine Visit: COVID-19 public health recommendations on in-person sessions  Mode of transmission: Secure real time interactive audio and visual telecommunication system (videoconference via Zoom)  Location of originating and distant sites:    Originating site (patient location): patient home    Distant site (provider site): HIPAA compliant location within provider home/remote setting  Telemedicine Visit: The patient's condition can be safely assessed and treated via synchronous audio and visual telemedicine encounter.    Patient has agreed to receiving services via telemedicine technology.    Diagnoses:   Encounter Diagnoses   Name Primary?     Obsessive-compulsive disorder, unspecified type Yes     Mixed obsessional thoughts and acts        Individuals Present:   Patient attended alone    Treatment goal(s) being addressed:   To reduce obsessions surrounding fears of contamination from urine and feces that lead to pronounced cleaning rituals. In addition, the patient has frequent bouts of anxiety each morning that are accompanied by heart palpitations. While these are often the result of lingering anxieties about showering or other contamination related fears, the patient still has these \"anxiety attacks\" even when there isn't something she is explicitly anxious about.     Subjective:    The patient continued to state that her luvox has been helping with her anxiety, noting that it may just be a placebo effect. However, while certain aspects of the anxiety had been better, the patient endorsed somatic symptoms of anxiety, namely heart palpitations in the morning. In general, she endorsed a decrease of 50% in anxiety symptoms. Her " "contamination anxiety was triggered by a visit to her parents house; the patient had diarrhea. The resultant \"splashing\" made her feel extremely contaminated. The patient was able to shower and to keep it at approximately 20 minutes, while an average shower has been 45 minutes during the COVID-19 pandemic. The patient also stated that during showers she washes her hair twice, and will \"march\" in the shower (I.e. raise her feet up and down) to alleviate the anxiety around feeling \"not clean enough\", a clear example of a ritual in response to the contamination fears. In addition, prior to showering, the patient will clean the area where she will be sitting afterwards to avoid contamination after she is clean.      Treatment:     The patient's ritual log for the past week was reviewed, detailed accounting of all of her obsessions and rituals was conducted, including those that she no longer endorses (e.g. obsessions surrounding madelin diseases).    Assessment and Progress:     Patient arrived on time to the appointment and was casually dressed and appropriately groomed. She apologized for her appearance - she was wearing sweatpants and a sweatshirt, which she has done for every single appointment so far. Attention and concentration were within normal boundaries. Speech was fluent, with normal prosody. Thought content was linear and goal-directed. No indication of unusual beliefs or perception. Patient was forthcoming, pleasant and cooperative. The patient was able to complete her ritual log and thought record, and was particularly thorough about the thought record.        Plan:     For homework, the patient was instructed to shower every other day with a time limit of 15 minutes. She was told to time each shower and record its duration.  She was instructed to continue to fill out a weekly ritual log. The patient stated that she would be at a cabin from 9/19 to 9/23. Upon her return, the patient was instructed to " "only use the bathroom with the \"kamikaze bunnies\" (dust bunnies contaminated with fecal matter).      Sheng Gutierrez  Practicum Student      "

## 2020-09-25 ENCOUNTER — VIRTUAL VISIT (OUTPATIENT)
Dept: PSYCHIATRY | Facility: CLINIC | Age: 40
End: 2020-09-25
Attending: PSYCHOLOGIST
Payer: COMMERCIAL

## 2020-09-25 DIAGNOSIS — F42.2 MIXED OBSESSIONAL THOUGHTS AND ACTS: ICD-10-CM

## 2020-09-25 DIAGNOSIS — F42.9 OBSESSIVE-COMPULSIVE DISORDER, UNSPECIFIED TYPE: Primary | ICD-10-CM

## 2020-09-30 NOTE — PROGRESS NOTES
I (Kole Ashley, Ph.D., ) reviewed this note and agree with it contents.  I did not staff this session in person.  This session will be discussed in supervision prior to the patient's next scheduled clinic appointment.

## 2020-10-02 ENCOUNTER — VIRTUAL VISIT (OUTPATIENT)
Dept: PSYCHIATRY | Facility: CLINIC | Age: 40
End: 2020-10-02
Attending: PSYCHOLOGIST
Payer: COMMERCIAL

## 2020-10-02 DIAGNOSIS — F42.9 OBSESSIVE-COMPULSIVE DISORDER, UNSPECIFIED TYPE: Primary | ICD-10-CM

## 2020-10-02 DIAGNOSIS — F42.2 MIXED OBSESSIONAL THOUGHTS AND ACTS: ICD-10-CM

## 2020-10-02 PROCEDURE — 90837 PSYTX W PT 60 MINUTES: CPT | Mod: HN

## 2020-10-04 NOTE — PROGRESS NOTES
"OUTPATIENT PSYCHOTHERAPY PROGRESS NOTE    Client Name: Katty Pavon   YOB: 1980 (40 year old)   Date of Service:  Sep 25, 2020  Time of Service: 2:00 PM to 3:10PM (70 minutes)  Service Type(s): Therapy     Type of service: Telemedicine Psychotherapy  Reason for Telemedicine Visit: COVID-19 public health recommendations on in-person sessions  Mode of transmission: Secure real time interactive audio and visual telecommunication system (videoconference via Zoom)  Location of originating and distant sites:    Originating site (patient location): patient home    Distant site (provider site): HIPAA compliant location within provider home/remote setting  Telemedicine Visit: The patient's condition can be safely assessed and treated via synchronous audio and visual telemedicine encounter.    Patient has agreed to receiving services via telemedicine technology.    Diagnoses:   Encounter Diagnoses   Name Primary?     Obsessive-compulsive disorder, unspecified type Yes     Mixed obsessional thoughts and acts        Individuals Present:   Patient attended alone    Treatment goal(s) being addressed:   To reduce obsessions surrounding fears of contamination from urine and feces that lead to pronounced cleaning rituals. In addition, the patient has frequent bouts of anxiety each morning that are accompanied by heart palpitations. While these are often the result of lingering anxieties about showering or other contamination related fears, the patient still has these \"anxiety attacks\" even when there isn't something she is explicitly anxious about.     Subjective:    The patient continued to state that her luvox has been helping with her anxiety, noting that it may just be a placebo effect. In the initial 30 minutes of the appointment, the patient discussed ongoing stressors in her life. In particular, she discussed marital conflict, as well as ongoing symptoms of panic upon awakening. The patient had been on " "vacation the last week, which she said was very helpful for her mindset, though it had brought up stress related to whether she wanted to remain in her marriage. Specifically, she feels that while her  is capable of empathizing with her OCD symptoms, his lack of understanding about 1) what it is like to experience these symptoms; and 2) how stressful they are for her, leave her with an overall sense that she is not being supported enough by him.       Treatment:     The patient's ritual log for the past week was reviewed, detailed accounting of all of her obsessions and rituals was conducted, including those that she no longer endorses (e.g. obsessions surrounding madelin diseases). HW assigned from the previous week was also reviewed, as well as her thought records.    Assessment and Progress:     Patient arrived on time to the appointment and was casually dressed and appropriately groomed. She apologized for her appearance - she was wearing sweatpants and a sweatshirt, which she has done for every single appointment so far. Attention and concentration were within normal boundaries. Speech was fluent, with normal prosody. Thought content was linear and goal-directed. No indication of unusual beliefs or perception. Patient was forthcoming, pleasant and cooperative. The patient was unable to keep her showers short (30 minutes) due to her being on vacation, but was able to have three successful showers.      Plan:     For homework, the patient was instructed to shower four times this week, keeping each shower to 30 minutes. She was instructed to make note of where she had to restart in the process. In addition, she was told to log the number of times she wipes after having a bowel movement (noting discoloration), to hold \"the kamikaze bunnies\" and to refrain from washing underneath her fingernails.    Sheng Gutierrez  Practicum Student      "

## 2020-10-08 NOTE — PROGRESS NOTES
"OUTPATIENT PSYCHOTHERAPY PROGRESS NOTE    Client Name: Katty Pavon   YOB: 1980 (40 year old)   Date of Service:  Oct 02, 2020  Time of Service: 2:00 PM to 3:05 PM (65 minutes)  Service Type(s): Therapy     Type of service: Telemedicine Psychotherapy  Reason for Telemedicine Visit: COVID-19 public health recommendations on in-person sessions  Mode of transmission: Secure real time interactive audio and visual telecommunication system (videoconference via Zoom)  Location of originating and distant sites:    Originating site (patient location): patient home    Distant site (provider site): HIPAA compliant location within provider home/remote setting  Telemedicine Visit: The patient's condition can be safely assessed and treated via synchronous audio and visual telemedicine encounter.    Patient has agreed to receiving services via telemedicine technology.    Diagnoses:   Encounter Diagnoses   Name Primary?     Obsessive-compulsive disorder, unspecified type Yes     Mixed obsessional thoughts and acts        Individuals Present:   Patient attended alone    Treatment goal(s) being addressed:   To reduce obsessions surrounding fears of contamination from urine and feces that lead to pronounced cleaning rituals. In addition, the patient has frequent bouts of anxiety each morning that are accompanied by heart palpitations. While these are often the result of lingering anxieties about showering or other contamination related fears, the patient still has these \"anxiety attacks\" even when there isn't something she is explicitly anxious about.     Subjective:    Given this was the 5th session (FCI point) or ERP treatment, the patient's progress through treatment was reviewed. She stated that luvox had been tremendously helpful. In addition, she noted that her symptoms of OCD had been very manageable the last week. An exception was the night before session: the patient noticed a stain on her underwear " "that she thought might be poop. As a result, she felt contaminated, sparking a cleaning spree where she bleached the outdoor chairs, and ended up throwing out several blankets and pillows that she had sat on. It also necessitated her taking a longer shower (around 40 minutes) in order to feel clean. Notably, the patient insightfully observed that she would be able to do any amount of exposure related to cleaning or touching the macie bunnies \"as long as I'm able to clean myself afterwards\".     Treatment:     HW assigned from the previous week was also reviewed, as well as her thought records.    Assessment and Progress:     Patient arrived on time to the appointment and was casually dressed and appropriately groomed. She apologized for her appearance - she was wearing sweatpants and a sweatshirt, which she has done for every single appointment so far. Attention and concentration were within normal boundaries. Speech was fluent, with normal prosody. Thought content was linear and goal-directed. No indication of unusual beliefs or perception. Patient was forthcoming, pleasant and cooperative. The patient was mostly able to keep her showers short during the week, and successfully recorded the amount of time (approximately) she spent doing the ritual (I.e. the \"pee dance\" in the shower). The  Patient stated that two of three showers were 30 minutes or less (she did not time precisely, just looked at clock), while the third was lengthened due to the incident of contamination. It appears that the patient performs \"the pee dance\" at least half the time she is showering except for \"when I am washing arms hands and legs\". The patient was unable to limit her flushing due to \"low-flow toilets\".    Plan:     For homework, the patient was instructed to shower four times this week. Instead of the pee dance in the shower, the anti ritual of moving her arms up and down and stating things such as \"I just peed myself\". In " "addition, she was told to go to area of the couch where she felt she was contaminated and say \"I'm covered in poop\", \"I'm spreading the poop\", etc. She was also told to say these things while holding the kamikaze bunnies. The patient was instructed to go to the trash where she had thrown out the pillows and blankets, and hold them in her hands saying \"I\"m contaminated\" over and over.    Sheng Gutierrez  Practicum Student      "

## 2020-10-09 ENCOUNTER — VIRTUAL VISIT (OUTPATIENT)
Dept: PSYCHIATRY | Facility: CLINIC | Age: 40
End: 2020-10-09
Attending: PSYCHOLOGIST
Payer: COMMERCIAL

## 2020-10-09 DIAGNOSIS — F42.9 OBSESSIVE-COMPULSIVE DISORDER, UNSPECIFIED TYPE: Primary | ICD-10-CM

## 2020-10-09 DIAGNOSIS — F42.2 MIXED OBSESSIONAL THOUGHTS AND ACTS: ICD-10-CM

## 2020-10-09 PROCEDURE — 90837 PSYTX W PT 60 MINUTES: CPT | Mod: HN

## 2020-10-12 NOTE — PROGRESS NOTES
"I (Kole Ashley, Ph.D., ) reviewed this note and agree with its contents; however I would clarify that \"kamakazi bunnies\" refers to small balls of dust on the floor of a basement bathroom that the patient fears are contaminated with feces.  I was not present during this session.  This session will be reviewed in supervision prior to the patient's next scheduled clinic appointment.     "

## 2020-10-12 NOTE — PROGRESS NOTES
I (Kole Ashley, Ph.D., ) reviewed this note and agree with its contents.  I was not present during this session.  This session will be reviewed in supervision prior to the patient's next scheduled clinic appointment.

## 2020-10-16 NOTE — PROGRESS NOTES
"OUTPATIENT PSYCHOTHERAPY PROGRESS NOTE    Client Name: Katty Pavon   YOB: 1980 (40 year old)   Date of Service:  Oct 09, 2020  Time of Service: 2:00 PM to 3:02 PM (62 minutes)  Service Type(s): Therapy     Type of service: Telemedicine Psychotherapy  Reason for Telemedicine Visit: COVID-19 public health recommendations on in-person sessions  Mode of transmission: Secure real time interactive audio and visual telecommunication system (videoconference via Zoom)  Location of originating and distant sites:    Originating site (patient location): patient home    Distant site (provider site): HIPAA compliant location within provider home/remote setting  Telemedicine Visit: The patient's condition can be safely assessed and treated via synchronous audio and visual telemedicine encounter.    Patient has agreed to receiving services via telemedicine technology.    Diagnoses:   Encounter Diagnoses   Name Primary?     Obsessive-compulsive disorder, unspecified type Yes     Mixed obsessional thoughts and acts        Individuals Present:   Patient attended alone    Treatment goal(s) being addressed:   To reduce obsessions surrounding fears of contamination from urine and feces that lead to pronounced cleaning rituals. In addition, the patient has frequent bouts of anxiety each morning that are accompanied by heart palpitations. While these are often the result of lingering anxieties about showering or other contamination related fears, the patient still has these \"anxiety attacks\" even when there isn't something she is explicitly anxious about.     Subjective:    She stated that luvox had been tremendously helpful. In addition, she noted that her symptoms in the past week had been drastically reduced. She was able to complete exposures with little difficulty. The patient reported that she was disappointed that her  had not noticed or acknowledged her improvement.     Treatment:     HW assigned from " "the previous week was also reviewed, as well as her thought records.    Assessment and Progress:     Patient arrived on time to the appointment and was casually dressed and appropriately groomed. Attention and concentration were within normal boundaries. Speech was fluent, with normal prosody. Thought content was linear and goal-directed. No indication of unusual beliefs or perception. Patient was forthcoming, pleasant and cooperative.The patient consistently took shorter showers throughout the week: 15 minutes. The only shower that was longer (18 minutes) was because she ran out of shampoo and had to get more. In addition, the patient performed her ritual in the shower (I.e. pee dance) very little (approximately 1/10 of the time). Finally, the patient was able to hold the \"kamikaze bunnies\" (dust bunnies that she believes are infected with feces). The patient rates improvement since the onset of treatment at 75-80 percent.    Plan:     For homework, the patient was instructed to shower four times this week.She was also instructed to keep her feet firmly planted on the ground, and think \"I just peed myself\", every time that she felt contaminated. In addition, she was told to go to area of the couch where she felt she was contaminated and say \"I'm covered in poop\", \"I'm spreading the poop\", etc. She was also told to say these things while holding the kamikaze bunnies. The patient was instructed to go to the trash where she had thrown out the pillows and blankets, and hold them in her hands saying \"I\"m contaminated\" over and over.    Sheng Gutierrez  Practicum Student      "

## 2020-10-30 NOTE — PROGRESS NOTES
I (Kole Ashley, Ph.D., ) reviewed this note and agree with its contents.  I did not staff this session in person.  This session will be reviewed in supervision prior to the patient's next scheduled clinic appointment.

## 2020-11-18 ENCOUNTER — VIRTUAL VISIT (OUTPATIENT)
Dept: GASTROENTEROLOGY | Facility: CLINIC | Age: 40
End: 2020-11-18
Payer: COMMERCIAL

## 2020-11-18 DIAGNOSIS — R11.2 NAUSEA AND VOMITING, INTRACTABILITY OF VOMITING NOT SPECIFIED, UNSPECIFIED VOMITING TYPE: Primary | ICD-10-CM

## 2020-11-18 DIAGNOSIS — R19.7 DIARRHEA, UNSPECIFIED TYPE: ICD-10-CM

## 2020-11-18 DIAGNOSIS — K21.9 GASTROESOPHAGEAL REFLUX DISEASE, UNSPECIFIED WHETHER ESOPHAGITIS PRESENT: ICD-10-CM

## 2020-11-18 PROCEDURE — 99204 OFFICE O/P NEW MOD 45 MIN: CPT | Mod: 95 | Performed by: INTERNAL MEDICINE

## 2020-11-18 RX ORDER — ONDANSETRON 8 MG/1
8 TABLET, ORALLY DISINTEGRATING ORAL EVERY 8 HOURS PRN
Qty: 30 TABLET | Refills: 3 | Status: SHIPPED | OUTPATIENT
Start: 2020-11-18 | End: 2021-09-11

## 2020-11-18 NOTE — PROGRESS NOTES
"Katty Pavon is a 40 year old female who is being evaluated via a billable video visit.      The patient has been notified of following:     \"This video visit will be conducted via a call between you and your physician/provider. We have found that certain health care needs can be provided without the need for an in-person physical exam.  This service lets us provide the care you need with a video conversation.  If a prescription is necessary we can send it directly to your pharmacy.  If lab work is needed we can place an order for that and you can then stop by our lab to have the test done at a later time.    If during the course of the call the physician/provider feels a video visit is not appropriate, you will not be charged for this service.\"     Patient has given verbal consent for Video visit? Yes    Patient would like the video invitation sent by: Send to e-mail at: [email or web address]    Video Start Time:     Katty Pavon complains of    Chief Complaint   Patient presents with     New Patient     Nausea and vomiting, per patient. diarrhea/ weekly episodes        I have reviewed and updated the patient's Past Medical History, Social History, Family History and Medication List.    ALLERGIES  Apricot flavor, Flagyl [metronidazole], and Keflex [cephalexin monohydrate]    Additional provider notes:    Assessment/Plan:        Video-Visit Details    Type of service:  Video Visit    Video End Time (time video stopped):     Originating Location (pt. Location):     Distant Location (provider location):  [facility]     Mode of Communication:  Video Conference via Rosanna Moncada MA      "

## 2020-11-18 NOTE — PATIENT INSTRUCTIONS
Start taking zofran when you begin to feel nauseous to help prevent these vomiting episodes.    Continue to try to avoid lactose - if you want to eat it - take a lactaid pill first.    Continue the fiber supplements to help bulk up the stool - you can increase this to three times a day.

## 2020-11-18 NOTE — PROGRESS NOTES
HPI:    Katty presents today for a video visit to discuss Intermittent episodes of nausea/vomiting/diarrhea which have been going on for years.  Now occur about once a month - used to be much more frequent.  Sometimes she is able to attribute her symptoms to dairy but not all the time.  Does think she is lactose intolerant but still occassionally has dairy.  Had lactaid but hasn't used it regularly.   These symptoms tend to come on with nausea, then a short time later develops vomiting and diarrhea simultaneously.  Has never tried anti-emetics.  Almost immediately after vomiting, she will feel back to normal and will sometimes even feel hungry.  No abdominal pain.  Does have occasional reflux which is controlled with OTC antacids. In between these episodes she otherwise feels fine.   Patient does have OCD and her main trigger is related to having bowel movements and cleanliness after them - has had a few episodes of fecal incontinence during these vomiting/diarrhea episodes which is understandably very distressing for her.    EGD for same symptoms in 2013 normal.    Past Medical History:   Diagnosis Date     Anxiety      Depression      OCD (obsessive compulsive disorder)        Past Surgical History:   Procedure Laterality Date     ESOPHAGOSCOPY, GASTROSCOPY, DUODENOSCOPY (EGD), COMBINED  12/13/2013    Procedure: COMBINED ESOPHAGOSCOPY, GASTROSCOPY, DUODENOSCOPY (EGD);;  Surgeon: Qasim Kennedy MD;  Location:  GI     EXTRACTION(S) DENTAL         Family History   Problem Relation Age of Onset     Skin Cancer Paternal Grandmother         Not Melanoma     Thyroid Cancer Paternal Grandmother      Alzheimer Disease Paternal Grandmother      Bladder Cancer Paternal Grandfather         Advanced     Depression Paternal Grandfather      Hyperlipidemia Father      Anxiety Disorder Father      Obesity Father      Unknown/Adopted Mother        Social History     Tobacco Use     Smoking status: Former Smoker      Types: Cigarettes     Smokeless tobacco: Never Used     Tobacco comment: very very rarely   Substance Use Topics     Alcohol use: Yes     Alcohol/week: 4.2 standard drinks     Types: 5 Standard drinks or equivalent per week     Frequency: 2-3 times a week     Drinks per session: 1 or 2     Binge frequency: Never     Comment: 5 drinks a week        O:    Gen: no acute distress  HEENT: NCAT  Neck: normal ROM  Resp: nonlabored breathing  Neuro: no gross deficits  Psych: appropriate mood and affect    Assessment and Plan:    # intermittent nausea/vomiting/diarrhea - ?food intolerance of some kind.  No abdominal pain to suggest biliary etiology. Abrupt onset and resolution not typical of gastroparesis.  Advised either avoiding lactose or adding lactase enzymes prior to eating things that may contain dairy.  Can also start a fiber supplement to help bulk up the stool.  Will give trial of SL ondansetron for patient to take immediately when she starts to feel nauseated to help abort the episodes.     # ?lactose intolerance - lactase enzyme as above.    # OCD  RTC 3 months    Melida Sweet DO     Video-Visit Details     Type of service:  Video Visit     Video Start Time: 3:00 PM  Video End Time (time video stopped): 3:31 PM    Originating Location (pt. Location): home     Distant Location (provider location):  [facility]      Mode of Communication:  Video Conference via [employer]

## 2020-12-08 ENCOUNTER — OFFICE VISIT (OUTPATIENT)
Dept: DERMATOLOGY | Facility: CLINIC | Age: 40
End: 2020-12-08
Payer: COMMERCIAL

## 2020-12-08 DIAGNOSIS — D22.9 MULTIPLE BENIGN NEVI: ICD-10-CM

## 2020-12-08 DIAGNOSIS — L82.1 SEBORRHEIC KERATOSIS: Primary | ICD-10-CM

## 2020-12-08 DIAGNOSIS — L82.0 INFLAMED SEBORRHEIC KERATOSIS: ICD-10-CM

## 2020-12-08 DIAGNOSIS — L81.4 SOLAR LENTIGO: ICD-10-CM

## 2020-12-08 PROCEDURE — 99202 OFFICE O/P NEW SF 15 MIN: CPT | Mod: 25 | Performed by: DERMATOLOGY

## 2020-12-08 PROCEDURE — 17110 DESTRUCTION B9 LES UP TO 14: CPT | Performed by: DERMATOLOGY

## 2020-12-08 ASSESSMENT — PAIN SCALES - GENERAL: PAINLEVEL: NO PAIN (0)

## 2020-12-08 NOTE — PATIENT INSTRUCTIONS
Cryotherapy    What is it?    Use of a very cold liquid, such as liquid nitrogen, to freeze and destroy abnormal skin cells that need to be removed    What should I expect?    Tenderness and redness    A small blister that might grow and fill with dark purple blood. There may be crusting.    More than one treatment may be needed if the lesions do not go away.    How do I care for the treated area?    Gently wash the area with your hands when bathing.    Use a thin layer of Vaseline to help with healing. You may use a Band-Aid.     The area should heal within 7-10 days and may leave behind a pink or lighter color.     Do not use an antibiotic or Neosporin ointment.     You may take acetaminophen (Tylenol) for pain.     Call your Doctor if you have:    Severe pain    Signs of infection (warmth, redness, cloudy yellow drainage, and or a bad smell)    Questions or concerns    Who should I call with questions?       [facility]: [phone]       [facility]: [phone]       For urgent needs outside of business hours call the [facility] at [phone]        and ask for the dermatology resident on call

## 2020-12-08 NOTE — PROGRESS NOTES
"[facility] Dermatology Note      Dermatology Problem List:  1. ISK. LiqN2. X 2 neck.     CC:   Chief Complaint   Patient presents with     Skin Check     Katty is here today for a skin check. She states \" I had a mole on my forehead that has just went away\"     Encounter Date: Dec 8, 2020    History of Present Illness:  Ms. Katty Pavon is a 40 year old female who with a history of skin cancer presents FBSE. She was initially recommended to have FBSE about a 1-2 years ago for evaluation of a scaly spot on her left forehead near the hairline. The area has since largely resolved. She reports a few similar appearing lesions on the face, as well as \"skin tags\" on the neck area. They are itchy and do rub on jewelry or clothing and would like them removed if possible.     The patient otherwise denies any new or concerning lesions. No bleeding, painful, pruritic, or changing lesions. They report no personal history of skin cancer. There is a family history of skin cancer in her paternal grandfather, grandmother (NMSC). No history of immunosuppression. No history of indoor tanning. They do use sunscreen and protective clothing when outdoors for sun protection. She does burn easily when outdoors without sunscreen. No occupational exposure to ultraviolet light or other forms of radiation. Patient is an  / chief developmental officer. Health otherwise stable. No other skin concerns.       Past Medical History:   Patient Active Problem List   Diagnosis     OCD (obsessive compulsive disorder)     Eczema     Moderate episode of recurrent major depressive disorder (H)     Past Medical History:   Diagnosis Date     Anxiety      Depression      OCD (obsessive compulsive disorder)      Past Surgical History:   Procedure Laterality Date     ESOPHAGOSCOPY, GASTROSCOPY, DUODENOSCOPY (EGD), COMBINED  12/13/2013    Procedure: COMBINED ESOPHAGOSCOPY, GASTROSCOPY, DUODENOSCOPY (EGD);;  Surgeon: Marcia" Qasim Sawant MD;  Location:  GI     EXTRACTION(S) DENTAL         Social History:  Patient reports that she has quit smoking. Her smoking use included cigarettes. She has never used smokeless tobacco. She reports current alcohol use of about 4.2 standard drinks of alcohol per week. She reports that she does not use drugs.    Family History:  Family History   Problem Relation Age of Onset     Skin Cancer Paternal Grandmother         Not Melanoma     Thyroid Cancer Paternal Grandmother      Alzheimer Disease Paternal Grandmother      Bladder Cancer Paternal Grandfather         Advanced     Depression Paternal Grandfather      Hyperlipidemia Father      Anxiety Disorder Father      Obesity Father      Unknown/Adopted Mother        Medications:  Current Outpatient Medications   Medication Sig Dispense Refill     ALPRAZolam (XANAX) 1 MG tablet Take 1 mg by mouth 3 times daily as needed for anxiety       fluvoxaMINE (LUVOX) 50 MG tablet Take 200 mg by mouth At Bedtime        melatonin 3 MG tablet As needed       ondansetron (ZOFRAN-ODT) 8 MG ODT tab Take 1 tablet (8 mg) by mouth every 8 hours as needed for nausea 30 tablet 3     VITAMIN D PO Take 4,000 Units by mouth        FLUOXETINE HCL PO Take 40 mg by mouth        Allergies   Allergen Reactions     Apricot Flavor Hives     Flagyl [Metronidazole] Hives     Keflex [Cephalexin Monohydrate] Hives         Review of Systems:  -As per HPI  -Constitutional: Otherwise feeling well today, in usual state of health.  -Skin: As above in HPI. No additional skin concerns.    Physical exam:  Vitals: There were no vitals taken for this visit.  GEN: This is a well developed, well-nourished female in no acute distress, in a pleasant mood.    SKIN: Full skin, which includes the head/face, both arms, chest, back, abdomen,both legs, genitalia and/or groin buttocks, digits and/or nails, was examined.  -Calle skin type: I/II  -Brown macules on sun exposed areas  -Scattered brown,  waxy, stuck-on appearing papules on face, trunk, and extremities including small lesion on the left forehead near hairline x 1, and two excoriated lesion with peripheral rim of erythema on the right neck x 2.   -Scattered brown macules on papules (50-60 total) on face, trunk, and extremities without concerning dermoscopy features.  -No other lesions of concern on areas examined.     Labs:  None.     Impression/Plan:    1. Seborrheic keratosis, inflamed. R neck x 2.   - Cryotherapy procedure note: After verbal consent and discussion of risks and benefits including but no limited to dyspigmentation/scar, blister, and pain, 2 lesions were treated with 1-2mm freeze border for 2 cycles with liquid nitrogen. Post cryotherapy instructions were provided.    2. Benign lesions: Multiple benign nevi, solar lentigos, seborrheic keratoses, cherry angiomas. Explained to patient benign nature of lesion. No treatment is necessary at this time unless the lesion changes or becomes symptomatic.     - ABCDs of melanoma were discussed and self skin checks were advised.  - Sun precaution was advised including the use of sun screens of SPF 30 or higher, sun protective clothing, and avoidance of tanning beds.    CC Yuni Chavira PA-C  [address] [place] [address] on close of this encounter.    Follow-up in 1 year, earlier for new or changing lesions. If exam stable at follow-up, consider spacing out skin checks to q2 years.     Staff Involved:  Staff Only    eRnzo Walker MD  Pronouns: he/him/his    Department of Dermatology  [facility]: Phone: [phone], Fax:[phone]  [facility]: Phone: [phone] Fax: [phone]

## 2020-12-08 NOTE — LETTER
"12/8/2020       RE: Katty Pavon  [address] [place] [address]     Dear Colleague,    Thank you for referring your patient, Katty Pavon, to the [facility] [place] at [facility]. Please see a copy of my visit note below.    [facility] Dermatology Note      Dermatology Problem List:  1. ISK. LiqN2. X 2 neck.     CC:   Chief Complaint   Patient presents with     Skin Check     Katty is here today for a skin check. She states \" I had a mole on my forehead that has just went away\"     Encounter Date: Dec 8, 2020    History of Present Illness:  Ms. Katty Pavon is a 40 year old female who with a history of skin cancer presents FBSE. She was initially recommended to have FBSE about a 1-2 years ago for evaluation of a scaly spot on her left forehead near the hairline. The area has since largely resolved. She reports a few similar appearing lesions on the face, as well as \"skin tags\" on the neck area. They are itchy and do rub on jewelry or clothing and would like them removed if possible.     The patient otherwise denies any new or concerning lesions. No bleeding, painful, pruritic, or changing lesions. They report no personal history of skin cancer. There is a family history of skin cancer in her paternal grandfather, grandmother (NMSC). No history of immunosuppression. No history of indoor tanning. They do use sunscreen and protective clothing when outdoors for sun protection. She does burn easily when outdoors without sunscreen. No occupational exposure to ultraviolet light or other forms of radiation. Patient is an  / chief developmental officer. Health otherwise stable. No other skin concerns.       Past Medical History:   Patient Active Problem List   Diagnosis     OCD (obsessive compulsive disorder)     Eczema     Moderate episode of recurrent major depressive disorder (H)     Past Medical " History:   Diagnosis Date     Anxiety      Depression      OCD (obsessive compulsive disorder)      Past Surgical History:   Procedure Laterality Date     ESOPHAGOSCOPY, GASTROSCOPY, DUODENOSCOPY (EGD), COMBINED  12/13/2013    Procedure: COMBINED ESOPHAGOSCOPY, GASTROSCOPY, DUODENOSCOPY (EGD);;  Surgeon: Qasim Kennedy MD;  Location:  GI     EXTRACTION(S) DENTAL         Social History:  Patient reports that she has quit smoking. Her smoking use included cigarettes. She has never used smokeless tobacco. She reports current alcohol use of about 4.2 standard drinks of alcohol per week. She reports that she does not use drugs.    Family History:  Family History   Problem Relation Age of Onset     Skin Cancer Paternal Grandmother         Not Melanoma     Thyroid Cancer Paternal Grandmother      Alzheimer Disease Paternal Grandmother      Bladder Cancer Paternal Grandfather         Advanced     Depression Paternal Grandfather      Hyperlipidemia Father      Anxiety Disorder Father      Obesity Father      Unknown/Adopted Mother        Medications:  Current Outpatient Medications   Medication Sig Dispense Refill     ALPRAZolam (XANAX) 1 MG tablet Take 1 mg by mouth 3 times daily as needed for anxiety       fluvoxaMINE (LUVOX) 50 MG tablet Take 200 mg by mouth At Bedtime        melatonin 3 MG tablet As needed       ondansetron (ZOFRAN-ODT) 8 MG ODT tab Take 1 tablet (8 mg) by mouth every 8 hours as needed for nausea 30 tablet 3     VITAMIN D PO Take 4,000 Units by mouth        FLUOXETINE HCL PO Take 40 mg by mouth        Allergies   Allergen Reactions     Apricot Flavor Hives     Flagyl [Metronidazole] Hives     Keflex [Cephalexin Monohydrate] Hives         Review of Systems:  -As per HPI  -Constitutional: Otherwise feeling well today, in usual state of health.  -Skin: As above in HPI. No additional skin concerns.    Physical exam:  Vitals: There were no vitals taken for this visit.  GEN: This is a well developed,  well-nourished female in no acute distress, in a pleasant mood.    SKIN: Full skin, which includes the head/face, both arms, chest, back, abdomen,both legs, genitalia and/or groin buttocks, digits and/or nails, was examined.  -Calle skin type: I/II  -Brown macules on sun exposed areas  -Scattered brown, waxy, stuck-on appearing papules on face, trunk, and extremities including small lesion on the left forehead near hairline x 1, and two excoriated lesion with peripheral rim of erythema on the right neck x 2.   -Scattered brown macules on papules (50-60 total) on face, trunk, and extremities without concerning dermoscopy features.  -No other lesions of concern on areas examined.     Labs:  None.     Impression/Plan:    1. Seborrheic keratosis, inflamed. R neck x 2.   - Cryotherapy procedure note: After verbal consent and discussion of risks and benefits including but no limited to dyspigmentation/scar, blister, and pain, 2 lesions were treated with 1-2mm freeze border for 2 cycles with liquid nitrogen. Post cryotherapy instructions were provided.    2. Benign lesions: Multiple benign nevi, solar lentigos, seborrheic keratoses, cherry angiomas. Explained to patient benign nature of lesion. No treatment is necessary at this time unless the lesion changes or becomes symptomatic.     - ABCDs of melanoma were discussed and self skin checks were advised.  - Sun precaution was advised including the use of sun screens of SPF 30 or higher, sun protective clothing, and avoidance of tanning beds.    CC MARIO ArevaloC  [address] [facility] [place] [address] on close of this encounter.    Follow-up in 1 year, earlier for new or changing lesions. If exam stable at follow-up, consider spacing out skin checks to q2 years.     Staff Involved:  Staff Only    Renzo Walker MD  Pronouns: he/him/his    Department of Dermatology  [facility]:  Phone: [phone], Fax:[phone]  [facility]: Phone: [phone] Fax: [phone]

## 2020-12-08 NOTE — NURSING NOTE
"Chief Complaint   Patient presents with     Skin Check     Katty is here today for a skin check. She states \" I had a mole on my forehead that has just went away\"     Ashley Jalloh, RMA  "

## 2021-01-15 ENCOUNTER — HEALTH MAINTENANCE LETTER (OUTPATIENT)
Age: 41
End: 2021-01-15

## 2021-01-24 ENCOUNTER — HEALTH MAINTENANCE LETTER (OUTPATIENT)
Age: 41
End: 2021-01-24

## 2021-05-22 ENCOUNTER — HOSPITAL ENCOUNTER (OUTPATIENT)
Facility: CLINIC | Age: 41
Setting detail: OBSERVATION
Discharge: HOME | End: 2021-05-23
Attending: EMERGENCY MEDICINE | Admitting: EMERGENCY MEDICINE
Payer: COMMERCIAL

## 2021-05-22 ENCOUNTER — NURSE TRIAGE (OUTPATIENT)
Dept: NURSING | Facility: CLINIC | Age: 41
End: 2021-05-22

## 2021-05-22 DIAGNOSIS — R45.851 SUICIDAL IDEATION: ICD-10-CM

## 2021-05-22 DIAGNOSIS — F42.9 OBSESSIVE-COMPULSIVE DISORDER, UNSPECIFIED TYPE: ICD-10-CM

## 2021-05-22 LAB
LABORATORY COMMENT REPORT: NORMAL
SARS-COV-2 RNA RESP QL NAA+PROBE: NEGATIVE
SPECIMEN SOURCE: NORMAL

## 2021-05-22 PROCEDURE — 99220 PR INITIAL OBSERVATION CARE,LEVEL III: CPT | Performed by: PSYCHIATRY & NEUROLOGY

## 2021-05-22 PROCEDURE — C9803 HOPD COVID-19 SPEC COLLECT: HCPCS

## 2021-05-22 PROCEDURE — 250N000013 HC RX MED GY IP 250 OP 250 PS 637: Performed by: PSYCHIATRY & NEUROLOGY

## 2021-05-22 PROCEDURE — G0378 HOSPITAL OBSERVATION PER HR: HCPCS

## 2021-05-22 PROCEDURE — 99285 EMERGENCY DEPT VISIT HI MDM: CPT | Mod: 25

## 2021-05-22 PROCEDURE — 87635 SARS-COV-2 COVID-19 AMP PRB: CPT | Performed by: EMERGENCY MEDICINE

## 2021-05-22 PROCEDURE — 90791 PSYCH DIAGNOSTIC EVALUATION: CPT

## 2021-05-22 RX ORDER — VITAMIN B COMPLEX
50 TABLET ORAL DAILY
Status: DISCONTINUED | OUTPATIENT
Start: 2021-05-22 | End: 2021-05-23 | Stop reason: HOSPADM

## 2021-05-22 RX ORDER — QUETIAPINE FUMARATE 25 MG/1
25 TABLET, FILM COATED ORAL EVERY 4 HOURS PRN
Status: DISCONTINUED | OUTPATIENT
Start: 2021-05-22 | End: 2021-05-23 | Stop reason: HOSPADM

## 2021-05-22 RX ORDER — FLUVOXAMINE MALEATE 100 MG
200 TABLET ORAL DAILY
Status: DISCONTINUED | OUTPATIENT
Start: 2021-05-22 | End: 2021-05-23 | Stop reason: HOSPADM

## 2021-05-22 RX ADMIN — QUETIAPINE 12.5 MG: 25 TABLET, FILM COATED ORAL at 18:39

## 2021-05-22 RX ADMIN — Medication 50 MCG: at 18:39

## 2021-05-22 RX ADMIN — FLUVOXAMINE MALEATE 200 MG: 100 TABLET ORAL at 18:39

## 2021-05-22 ASSESSMENT — ACTIVITIES OF DAILY LIVING (ADL)
HYGIENE/GROOMING: INDEPENDENT
DRESS: STREET CLOTHES
HYGIENE/GROOMING: INDEPENDENT
DRESS: INDEPENDENT;STREET CLOTHES
ORAL_HYGIENE: INDEPENDENT
ORAL_HYGIENE: INDEPENDENT

## 2021-05-22 ASSESSMENT — MIFFLIN-ST. JEOR: SCORE: 1445.14

## 2021-05-22 NOTE — ED PROVIDER NOTES
"  History     Chief Complaint:  Psychiatric Evaluation    HPI   Katty Pavon is a 41 year old female with a history of OCD and anxiety who presents for a psychiatric evaluation. This morning the patient reports that she had a triggering event in the shower this morning and became despondent in the shower for 5 hours. She mentioned that during this episodes she is shaking and noted that she is having some suicidal ideation with no plan. Her friend, that she lives with, called nearby hospitals and instructed the patient to come to the ED.  She stated that she has random triggers and doesn't know what triggers the episodes. The patient has never been hospitalized in [facility] for psychiatric issues, but notes that she does have a therapist and has home coping mechanisms she uses. She previously has used Xanax for anxiety, but states she no longer takes this medication. She otherwise denies any medical complaints at this time.     Review of Systems   Psychiatric/Behavioral: Positive for suicidal ideas.   All other systems reviewed and are negative.      Allergies:  Apricot Flavor  Flagyl [Metronidazole]  Keflex [Cephalexin Monohydrate]    Medications:    Fluoxetine  Luvox  Zofran    Past Medical History:    Anxiety  Depression  OCD    Past Surgical History:    Esophagoscopy, gastroscopy, duodenoscopy, combined  Dental extractions     Family History:    Father: hyperlipidemia, anxiety, obesity    Social History:  The patient lives with a friend.  She presents alone.     Physical Exam     Patient Vitals for the past 24 hrs:   BP Temp Temp src Pulse Resp SpO2 Height Weight   05/22/21 1151 123/75 98.7  F (37.1  C) Oral 92 18 98 % 1.702 m (5' 7\") 75.8 kg (167 lb)       Physical Exam  Vitals: reviewed by me  General: Pt seen on \[facility] [place] [facility]\"", pleasant, cooperative, and alert to conversation  Eyes: Tracking well, clear conjunctiva BL  ENT: MMM, midline trachea.   Lungs: No tachypnea, no accessory muscle use. No " respiratory distress.   CV: Rate as above  MSK: no joint effusion.  No evidence of trauma  Skin: No rash  Neuro: Clear speech and no facial droop.  Psych: Not RIS, no e/o AH/VH, endorses suicidal ideation.  No plan.      Emergency Department Course     Laboratory:  Asymptomatic COVID19 Virus PCR by nasopharyngeal swab: Negative    Emergency Department Course:    Reviewed:  I reviewed nursing notes, vitals and past history    Assessments:  1220 I obtained history and examined the patient as noted above.     Disposition:  Transferred to [facility].    Impression & Plan      Medical Decision Making:  Katty Pavon is a 41 year old female who presents to the emergency room with what appears to be OCD and passive suicidal ideation. She has no medical complains at this time, appears high functioning, but certainly does appear to be in a position to benefit from mental health resource. Her COVID swab was normal. She is comfortable going to [facility]. She has no medical complaints at this time and is medically cleared.     Covid-19  Katty Pavon was evaluated during a global COVID-19 pandemic, which necessitated consideration that the patient might be at risk for infection with the SARS-CoV-2 virus that causes COVID-19.   Applicable protocols for evaluation were followed during the patient's care.   COVID-19 was considered as part of the patient's evaluation. The plan for testing is:  a test was obtained during this visit.    Diagnosis:    ICD-10-CM    1. Obsessive-compulsive disorder, unspecified type  F42.9    2. Suicidal ideation  R45.851        Scribe Disclosure:  IKristel, am serving as a scribe at 12:15 PM on 5/22/2021 to document services personally performed by Delfino Goldsmith based on my observations and the provider's statements to me.     LEONORA, Kurt Miles, am serving as a scribe () on 5/22/2021 at 12:43 PM to personally document services performed by Delfino Goldsmith based  on my observations and the provider's statements to me.       Delfino Goldsmith MD  05/22/21 0218

## 2021-05-22 NOTE — TELEPHONE ENCOUNTER
Triage Call:    -Friend Katarina calling stating she is concerned about patient. Friend Katarina is with patient now. RN gathered information and gave advice, did not disclose any health information of patient.   -Katarina states patient has severe OCD that has been ongoing since patient was a child.   -Patient is severely depressed per friend Katarina.   -Patient expressed to friend Katarina that she is having thoughts of self harm.  -This morning patient was in the shower from 4am for about 5 hours related to feeling unclean with feces and urine related to her diagnosis of OCD.   -Patient has expressed that she is feeling suicidal.  -Patient states per friend Katarina she has had thoughts of a plan, but has not acted out this plan or attempted.   -Patient told Katarina she is fearful that she won't admitted, but knows she is not doing well.   -Patient told friend Katarina she thought of suicide in the shower earlier this morning at 4am.    -Patient stated to Katarina that she has had thoughts of taking Adderall more than the prescribed dose.   -Katarina states she has never heard her patient speak this way in the many years she has known patient.   -Symptoms have been worsening since Wednesday per Katarina.   -Katarina stated that patient was refusing to go into ER earlier this morning, but is now agreeable to go.   -Per protocol, recommendations are for patient to be seen in ER now. RN advised Katarina to bring patient into ED now, if sx worsen to call 911. Katarina had called [facility] who referred patient to [facility]. RN advised Katarina to bring patient to [facility] ER.  Katarina (friend) verbalized understanding and agrees with plan.    Drea Bailey RN, BSN Nurse Triage Advisor 10:01 AM 5/22/2021      Reason for Disposition    [1] Depression symptoms (sadness, hopelessness, decreased energy) AND [2] unable to do any normal activities (e.g., self care, school, work; in comparison to baseline).    Additional Information     Negative: Patient attempted suicide    Negative: Patient is threatening suicide now    Negative: Violent behavior, or threatening to physically hurt or kill someone    Negative: [1] Patient is very confused (disoriented, slurred speech) AND [2] no other adult (e.g., friend or family member) available    Negative: [1] Difficult to awaken or acting very confused (disoriented, slurred speech) AND [2] new onset    Negative: Sounds like a life-threatening emergency to the triager    Protocols used: SUICIDE [id number]    COVID 19 Nurse Triage Plan/Patient Instructions    Please be aware that novel coronavirus (COVID-19) may be circulating in the community. If you develop symptoms such as fever, cough, or SOB or if you have concerns about the presence of another infection including coronavirus (COVID-19), please contact your health care provider or visit [email or web address].     Disposition/Instructions    ED Visit recommended. Follow protocol based instructions.     Bring Your Own Device:  Please also bring your smart device(s) (smart phones, tablets, laptops) and their charging cables for your personal use and to communicate with your care team during your visit.    Thank you for taking steps to prevent the spread of this virus.  o Limit your contact with others.  o Wear a simple mask to cover your cough.  o Wash your hands well and often.    Resources    [facility]: About COVID-19: [email or web address]    CDC: What to Do If You're Sick: www.cdc.gov/coronavirus/2019-ncov/about/steps-when-sick.html    CDC: Ending Home Isolation: www.cdc.gov/coronavirus/2019-ncov/hcp/disposition-in-home-patients.html     CDC: Caring for Someone: www.cdc.gov/coronavirus/2019-ncov/if-you-are-sick/care-for-someone.html     [facility]: Interim Guidance for Hospital Discharge to Home: www.health.On [facility].[place]/diseases/coronavirus/hcp/hospdischarge.pdf    [facility] clinical trials (COVID-19 research studies):  clinicalaffairs.[facility]-clinical-trials     Below are the COVID-19 hotlines at the [place] Department of Health ([facility]). Interpreters are available.   o For health questions: Call 868-155-6142 or 1-632.363.9383 (7 a.m. to 7 p.m.)  o For questions about schools and childcare: Call 527-350-6726 or 1-952.803.7038 (7 a.m. to 7 p.m.)

## 2021-05-22 NOTE — ED NOTES
Pt admitted form [facility] ER with increased anxiety and OCD symptoms. Pt had been triggered this morning which led her to taking a shower for 5 hours. Pt was unable to move from the shower and was overwhelmed. Pt states she has been dealing with MDD and anxiety with OCD for over 20 years. Pt talked about her family hx of anxiety and OCD. Pt stated she has been experiencing increased symptoms anxiety and OCD. Pt states stressed at home with the recent custody espinoza for her step kids and stressed at home. Pt also expressed that she does have some stress at work in which she is currently being investigated by HR at her place of employment. Pt does talk bout her hx and states she has extensive OP services including several therapists, life coaches and psychiatrist. Pt currently is seen at [facility] for psychiatry and therapy. Pt has been having increased thoughts of not wanting to live put states she has no plan. Pt feels like a burden to her family and feels like a failure. Pt is hyperverbal tangential and pressured in her speech. Pt has some disorganization to her thoughts but does have great insight into her illness. Pt states she is open to any changes that need to be made and is asking for help.  Nursing and risk assessments completed.  Assessments reviewed with LMHP and physician. Video monitoring in progress, patient informed.  Admission information reviewed with patient. Patient given a tour of [facility] and instructions on using the facility. Questions regarding [facility] addressed. Pt search completed and belongings inventoried.

## 2021-05-22 NOTE — ED PROVIDER NOTES
"ED Observation Psychiatric [facility] Emergency Department - [facility] Unit  Observation Initiation Date: *May 22, 2021    Katty Pavon MRN: [id number]   Age: 41 year old YOB: 1980     History     Chief Complaint   Patient presents with     Psychiatric Evaluation     HPI  Katty Pavon is a 41 year old female with PMH notable for obsessive-compulsive disorder, anxiety, depression, admitted to the ED Observation Unit with decompensating mental health symptoms and suicidal ideation.  Patient was brought into the emergency room by her friend for psychiatric evaluation.    The patient was seen on the unit for evaluation.  Patient seems to be a very reliable historian.  She reports it is very difficult for her to be here since she has always been able to manage even her most severe mental health symptoms outside of the hospital.  She reports a long history of mental health struggles.  She was diagnosed with her first major depressive episode when 7 years old.  Her sister has also struggled with severe mental illness and was hospitalized for a significant stay at age 11 for contamination OCD.  Patient mentioned several very distinct \"episodes\" that she can remember.  If I remember correctly, she mentioned episodes at ages 7, 13, 16, 28, and currently.  She reports her OCD symptoms being \"the circuit breaker\" when things start to get really bad during episodes.  Her obsessions will start to flareup in her anxiety will increase along with her depression.  She said according to the Y-BOCS screening form, she deals with \"90% obsessions and only 10% compulsions.\"      She has had plenty of therapy and has developed significant insight into her symptoms which has been helpful.  She has a history of being on fluoxetine 80 mg daily with only some efficacy.  She is also trialed Lexapro and is now currently taking fluvoxamine 200 mg daily.  She has been engaging in ERP therapy but does not know how " "helpful it has been considering her severe insomnia, severe anxiety, and current mood symptoms.  She is had an incredible amount of psychosocial stressors over the past several months.  The \"circuit breaker\" switched this past September.  She feels her  does not understand her mental health symptoms very well and so she moved in with her sister for a couple of months.  Things have not gotten much better over the past several months, and in fact, she has now started to have some occupational struggles.  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  They are currently being investigated for substantiation.  She has not been sleeping.  She ended up taking a shower for nearly 5 hours this morning after \"triggering event.\"  Patient reports her neighbors had become concerned about her and so did her friend who eventually is the one who called the clinic and brought her in to the emergency room for evaluation.    Patient has an outpatient psychiatric provider and therapist at [facility].  She also mentioned having an .  Patient is not quite sure where to go next for help and so she came here to the emergency room due to her inability to function appropriately today.  She has had some passive SI with no plan or intent at this time.  Her appetite is been good.  She used to have involuntary gaging as a severe panic/anxiety response.  She will now have sensations of burning in her head and racing heart when she becomes very anxious.  She does have a prescription of alprazolam at home and had not used it much at all until the past week when she used it twice.  During one of her past episodes she took clonazepam for 6-8 weeks to get her through it (along with her other medication ) but then stopped the medication would no longer needed and has not needed it for several years.  She denies any auditory or visual hallucinations.  She denies any problematic drug or alcohol use.  She has no past " psychiatric hospitalizations, no past suicide attempts.    Past Medical History  Past Medical History:   Diagnosis Date     Anxiety      Depression      OCD (obsessive compulsive disorder)      Past Surgical History:   Procedure Laterality Date     ESOPHAGOSCOPY, GASTROSCOPY, DUODENOSCOPY (EGD), COMBINED  12/13/2013    Procedure: COMBINED ESOPHAGOSCOPY, GASTROSCOPY, DUODENOSCOPY (EGD);;  Surgeon: Qasim Kennedy MD;  Location: [facility]     EXTRACTION(S) DENTAL       ALPRAZolam (XANAX) 1 MG tablet  fluvoxaMINE (LUVOX) 50 MG tablet  FLUOXETINE HCL PO  melatonin 3 MG tablet  ondansetron (ZOFRAN-ODT) 8 MG ODT tab  VITAMIN D PO      Allergies   Allergen Reactions     Apricot Flavor Hives     Flagyl [Metronidazole] Hives     Keflex [Cephalexin Monohydrate] Hives     Family History  She reports her father has bipolar disorder  Family History   Problem Relation Age of Onset     Skin Cancer Paternal Grandmother         Not Melanoma     Thyroid Cancer Paternal Grandmother      Alzheimer Disease Paternal Grandmother      Bladder Cancer Paternal Grandfather         Advanced     Depression Paternal Grandfather      Hyperlipidemia Father      Anxiety Disorder Father      Obesity Father      Unknown/Adopted Mother      Social History   Social History     Tobacco Use     Smoking status: Current Every Day Smoker     Packs/day: 1.00     Types: Cigarettes     Smokeless tobacco: Never Used     Tobacco comment: very very rarely   Substance Use Topics     Alcohol use: Yes     Alcohol/week: 4.2 standard drinks     Types: 5 Standard drinks or equivalent per week     Frequency: 2-3 times a week     Drinks per session: 1 or 2     Binge frequency: Never     Comment: rare     Drug use: No      Past medical history, past surgical history, medications, allergies, family history, and social history were reviewed with the patient. No additional pertinent items.       Review of Systems  A complete review of systems was performed with pertinent  "positives and negatives noted in the HPI, and all other systems negative.    Physical Examination   BP: 123/75  Pulse: 92  Temp: 98.7  F (37.1  C)  Resp: 18  Height: 170.2 cm (5' 7\")  Weight: 75.8 kg (167 lb)  SpO2: 98 %    Physical Exam  General: Awake, alert. Appears stated age.   Neuro: alert and fully oriented. CN II-XII grossly intact by observation. Grossly normal strength in all extremities by observation.   Integumentary/Skin: no rash visualized, normal color    Psychiatric Examination   Appearance: awake, alert but tired appearing, adequately groomed  Attitude:  cooperative  Eye Contact:  good  Mood:  anxious and depressed  Affect:  intensity is heightened  Speech: Hyperverbal and a bit pressured, but but overall clear, coherent, normal volume  Psychomotor Behavior:  Very fidgety and somewhat restless  Throught Process:  Rambling and tangential  Associations:  no loose associations  Thought Content:  Passive suicidal ideation, no homicidal ideation, no evidence of psychotic thought, no auditory or visual hallucinations  Insight:  good  Judgement:  intact  Oriented to:  time, person, and place  Attention Span and Concentration:  intact  Recent and Remote Memory:  intact    ED Course        Labs Ordered and Resulted from Time of ED Arrival Up to the Time of Departure from the ED   SARS-COV-2 (COVID-19) VIRUS RT-PCR       Assessments & Plan (with Medical Decision Making)   Patient presenting with worsening depression, anxiety, and OCD symptoms.  Patient with long mental health history and current decompensation.  Has a couple outpatient therapists and also an outpatient psychiatrist but the support is not currently sufficient at this time given her current crisis.  On interview she almost seemed a bit hypomanic.  Speech was a little pressured, energy heightened, anxious energy.  She has also been suffering from pretty severe insomnia.  If anything, I do wonder about a bit of a mixed episode if she were to have " a diagnosis of bipolar disorder.  There is genetic loading since father has bipolar disorder. She does report being a high energy individual.  I recommended she continue to monitor her sxs and keep that possibility in mind with her other providers.  Hypomanic/manic/mixed bipolar episode could certainly be a set up for much worsening OCD and anxiety symptoms.  She also has memory of these very distinct episodes throughout her past.  All the episodes seem quite similar and she can usually feel them coming on.  It seems they are typically precipitated by significantly increased stress and worsening insomnia.  However, whether it might be a severe OCD flare, difficult to treat depression, severe anxiety with insomnia, I recommend a trial with quetiapine.  Quetiapine is helpful for anxiety, insomnia, treatment resistant depression, bipolar disorder, and some severe OCD cases when used as an augment.  She could also return to clonazepam for a short period of time like she has done with efficacy in the past.   she is agreeable to a trial with quetiapine at this time and we will start that tonight.   Discussed risks of metabolic disorders and long-term movement disorders/TD.  If she tolerates well would need to monitor lipids and fasting glucose which she agrees to (does have history of abnormal lipid panel).  Also talked about risk of weight gain.  Nursing notes reviewed.     I have reviewed the DEC assessment complete by Sue Felton [facility] dated 5/22/2021.    Plan:  -Continue PTA fluvoxamine 200 mg daily for OCD, anxiety, depression  -Continue vitamin D 2000 mg daily (hx of vitamin D deficiency)  -Start quetiapine 12.5 mg 1 time dose.  If tolerates well, will give another 25-50 mg before bedtime tonight.  -Admit to observation status    The patient was found to have a psychiatric condition that would benefit from an observation stay in the emergency department for further psychiatric stabilization and/or coordination of  a safe disposition. The observation plan includes serial assessments of psychiatric condition, potential administration of medications if indicated, further disposition pending the patient's psychiatric course during the monitoring period.     Preliminary diagnosis:  Mood disorder, unspecified (R/O Bipolar Disorder)  Obsessive-compulsive disorder  Anxiety, unspecified  Insomnia, unspecified    --  Radha Flynn DO   [facility] EMERGENCY DEPT  Em[facility] Unit  5/22/2021        Radha Flynn DO  05/22/21 [id number]

## 2021-05-22 NOTE — ED TRIAGE NOTES
"Dx w/ ocd anxiety and depression. This am had \"triggering event\" and was in shower form 4am-9am. Became despondent. Pt states SI without plan. Has tried outpatient resources. Pt reports unable to control compulsions. Pt's friend states pt has plan but won't do anything with it. Pt states she does not have plan. Pt reports work stress as well  "

## 2021-05-22 NOTE — CONSULTS
"5/22/2021  Katty Pavon 1980     [facility] Mental Health Assessment:    Started at: 2:30 pm  Completed at: 3:40 pm  What type of assessment are you doing today? Full DA    1.  Presenting Problem:      Referral Method to ED? Family/Friends     What brings the patient to the ED today? Pt reports she took a 5 hour shower today. She reports having contamination Obsessive Compulsive Disorder. Her friend was in town and was concerned about Katty's mental health due to being in the shower for 5 hours due to her feelings of contamination. Pt stated she began to experience self loathing while in the shower and felt upset that her symptoms haven't improved even after participating in therapy and psychiatry services for an extended period of time. She began to have thoughts such as \"people would be better off without me\" and \"I am a burden\". She denied suicidal plan or intent but acknowledge passive suicidal ideation.    Has this happened before? Yes Pt denies past suicidal ideation but states she did have signifcant somatic symptoms potentiall realted to anxiety in 2013.     Duration of presenting problem: Pt reports she has been struggling with intensifying mental health concerns starting fall of last year. She reports the last week has been worsening due to work stress and a complaint that has been filed against her.    Additional Stressors: Pt reports that she has had a stressful 5 years relating to custody espinoza for her step children. She reports that her  lacks emotional and psychological awareness and that although he is supportive he is not always sure of how to be most helpful and supportive. She recently was informed that there are formal complaints against her at work for \"toxic\" behavior and this will be going to a  this next week. Pt reports her job has been a stressful environment and she is struggling with self loathing due to the concerns at work.    2.  Risk Assessment:  Suicide and " Self-Harm    ESS-6  1.a. Over the past 2 weeks, have you had thoughts of killing yourself? Yes   1.b. Have you ever attempted to kill yourself and, if yes, when did this last happen? No  2. Recent or current suicide plan? No  3. Recent or current intent to act on ideation? No  4. Lifetime psychiatric hospitalization? No  5. Pattern of excessive substance use? No  6. Current irritability, agitation, or aggression? No  ESS-6 Score: 1- Mild Risk    SI: Passive  Plan: No  Intent: No   Prior Attempts: No     Protective Factors: Pt does not want to or plan to kill herself. She has a high level of self awareness and insight. She would like to feel better and is intelligent.    Hopes and goals for the future: Pt reported feeling concern this week due to feeling hopeless. 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 Pt reports that few things have been helpful recently, she does attend therapy and is working on exposure therapy but it hasn't been helpful recently.    Additional Risk Factors Related to Safety and Suicide: No    Is the patient engaged in self injurious behaviors? No     Risk to Others    Aggressive/Assaultive/Homicidal Risk Factors: No     Duty to Warn? No     Was a Child Protection Report Made? No       Was a Adult Protection Report Made? No        Sexually inappropriate behavior? No        Vulnerability to sexual exploitation? No     Additional information: No additional information      3. Mental Health Symptoms and Substance Use  Current Symptoms and Mental Health History    GAIN Short Screener (GAIN-SS) administered? NA    Attention, Hyperactivity, and Impulsivity Symptoms      Patient reported symptoms related to hyperactivity, inattention, or impulsivity? No       Anxiety Symptoms    Patient reported anxiety symptoms? Yes: Obsessions/Compulsions (counting,  "ritualistic behavior, needing things to be \"just so\") and Generalized Symptoms: Cognitive anxiety - feelings of doom, racing thoughts, difficulty concentrating , Excessive worry, Physiological anxiety - sweating, flushing, shaking, shortness of breath, or racing heart and Somatic symptoms - abdominal pain, headache, or tension         Behavioral Difficulties    Patient reported behavioral difficulties? No       Mood Symptoms    Patient reported mood disorder symptoms? Yes: Feelings of helplessness , Feelings of hopelessness , Feelings of worthlessness , Low self esteem , Sad, depressed mood , Sleep disturbance , Somatic complaints and Thoughts of suicide/death        Eating Disorders and Appetite Disturbance      Patient reported appetite symptoms? No       SCOFF  Do you make yourself sick (induce vomiting) because you feel uncomfortably full? No   Do you worry that you have lost Control over how much you eat? No  Have you recently lost more than 14 lb in a three-month period? No   Do you think you are too fat, even though others say you are too thin? No   Would you say that food dominates your life? No  SCOFF Score: 0    Patient reported appetite or eating disorder symptoms? No      Interpersonal Functioning     Patient reported difficulties that may be associated with personality and interpersonal functioning? Yes: Emotional Deregulation and Impaired Interpersonal Functioning      Learning Disabilities/Cognitive/Developmental Disorders    Patient reported concerns related to learning disabilities, cognitive challenges, and/or developmental disorders? No       General Cognitive Impairments    Patient reported symptoms of cognitive impairments? No  If yes, complete Mini-Cog Assessment.    Sleep Disturbance    Patient reported difficulties with sleep? Yes: Difficulty falling asleep         Psychosis Symptoms    Patient reported symptoms of psychosis? No        Trauma and Post-Traumatic Stress Disorder    Physical " Abuse: No   Emotional/Psychological Abuse: No  Sexual Abuse: No  Loss of a friend or family member to suicide: No  Other Traumatic Event: Yes reports that going through custody espinoza for her 's children was very traumatic. Reported loss and sadness due to the impact of trauma for her step children and life decisions they have made.     Patient reported trauma related symptoms? Yes: Intrusions: Dissociative reactions and Intense psychological distress when you are exposed to reminders/cues of the event and Negative Cognitions/Mood: Persistent negative beliefs about oneself, others, or the world, Persistent distorted cognitions about cause/consequences of the trauma (e.g., self-blame), Persistent negative emotional state (e.g., fear, anger, shame) and Inability to experience positive emotions       Impact of Mental Health on Functioning      Negative Impact Score: 8/10   Subjective Impact on functioning: impacting sleep, overall mood, and work functioning  How do symptoms vary from baseline? Pt is usually able to function well and manage mental health symptoms.    Current and Historical Substance Use Note:    IIs there a history of, or current, substance use? No     Have you been to chemical dependency treatment or detox before? No     CAGE-AID    Have you felt you ought to cut down on your drinking or drug use? No     Have people annoyed you by criticizing your drinking or drug use? No   Have you felt bad or guilty about your drinking or drug use? No  Have you ever had a drink or used drugs first thing in the morning to steady your nerves or to get rid of a hangover? No   CAGE-AID Score: 0/4    Drug screen completed? No   BAL/Breathalyzer completed? No       Mental Status Exam:    Affect: Appropriate  Appearance: Appropriate   Attention Span/Concentration: Attentive    Eye Contact: Engaged  Fund of Knowledge: Appropriate   Language /Speech Content: Fluent  Language /Speech Volume: Normal   Language /Speech  "Rate/Productions: Pressured   Recent Memory: Intact  Remote Memory: Intact  Mood: Anxious, Depressed and Sad   Orientation:   Person: Yes   Place: Yes  Time of Day: Yes   Date: Yes   Situation (Do they understand why they are here?): Yes   Psychomotor Behavior: Normal   Thought Content: Clear  Thought Form: Flight of Ideas and Tangential    4. Social and Environmental Conditions   Is the patient their own guardian? Yes    Living Situation: With others: lives with     Support system and quality of connections: Pt reports she has helpful friends. She reports her  is supportive but he is not a strong communicator. She reports he does not know what to do to help her and is at a loss and appears to struggle with understanding mental health concerns.    Income source: Employment: pt is employed as a     Issues with employment or education: Yes Recently there were formal complaints at work towards pt. She stated it is related to \"toxic\" behavior. she stated this will be presented to a  and she has a high level of anxiety about this circumstance and feelings of shame and self loathing.    Legal Concerns  Do you have any history of or current involvement with the legal system? No    Spiritual and Cultural Influences  Do you have any Worship beliefs that are important in your life? No Pt is culturally [place] but states she is agnostic.    Do you have any cultural influences in your life that impact your mental health care? No        5. Psychiatric History, Medical History, and Current Care      Patient Mental Health Services   Does the patient have a history of mental health concerns/diagnoses? Yes Major Depressive Disorder Obsessive Compulsive Disorder     Current Providers  Primary Care Provider: Yes [facility]   Psychiatrist: Yes Dr. Nelly [facility]  Therapist: Yes Gay Trujillo at [facility] Pt is receiving " ERP- Exposure Response Prevention Therapy from [facility]  : No   ARMHS: No   ACT Team: No   Other: No    History of Commitment? No  History of Psychiatric Hospitalizations? No   History of programmatic care? No    Family Mental Health History   Family History of Mental Health or Chemical Dependency Issues? Yes Reports her father is Bipolar and she believes her mother has OCD tendancies but is not diagnosed. She reports she has a sister who is also diagnosed with OCD     Development and Physical Health Challenges  Delays or concerns meeting developmental milestones? No  Current psychotropic medications? Yes Luvox 200mg PRN of Xanax took two this week but has not taken for over a year previously  Medication Compliant? Yes   Recent medication changes? No    History of concussion or TBI? No     Additional Information: Pt reports feelings of sadness and frustration about putting in effort to improve mental health symptoms and participating in multiple therapies without seeing significant changes or improvement in symptoms.    6. Collateral Information and Collaboration    Collaboration with medical staff:Referral Information:   Medical Records and Psychiatry     Collateral Information/Sources: Family: Spoke to Katarina Dukes-Friend and Alejo Burgess . Reported concerns teresa Alaniz had made two passive suicidal statements this week and is significantly struggling with symptoms. Reports that they believe she is in need of more intensive services due to the level of her concerns versus continuing only weekly outpatient services.    7. Assessment and Diagnosis  Assessment of patient strengths and vulnerabilities    Strengths, Protective Factors, & Community Resources: Pt has supportive family and friends, she is able to navigate systems and seek help when needed, she is connected to outpatient therapeutic services    Patient skills, abilities, and coping skills (what is going well?): Pt is invested in her mental  health treatment, she demonstrates self awareness, she is articulate and able to communicate her emotions and concerns.    Patient vulnerabilities: Pt has a high level of self loathing, low self esteem and her symptoms have been treatment resistant    Diagnosis  F42.2 Obsessive Compulsive Disorder        8.Therapeutic Methodologies Utilized in Assessment    Psychotherapy techniques and/or interventions used: Establishing rapport, Active listening, Assess dimensions of crisis, Brief Supportive Therapy and Trauma-Informed Care    9. Patient Care/Treatment Plan  Summary of Patient Presentation and needs  What are the basic needs for this patient in this moment? Pt needs some rest and to be able to stablize her emotional state.      Consultations :  Attending provider consulted? Yes  Attending Name: Dr. Radha John   Attending concurs with disposition? Yes     Recommended disposition: Other: Pt is currently on Observation Status and was perscribed Seroquel to assist with reducing distress. Pt may benefit from Day Treatment Services due to her level of concerns. She should be reassessed in the morning. Day treatment was discussed with pt at time of assessment and she was open to pursuing this option.     Does the patient agree with the recommended level of care? Yes    Final disposition: Other: Information is being gathered and pt is on observation status final disposition has not been determined yet.    Disposition Details: Recommend day treatment services    If Inpatient, is patient admitted voluntary? N/A   Patient aware of potential for transfer if there is not appropriate placement? NA  Patient is willing to travel outside of the [facility] for placement? NA   Central Intake Notified? NA    10. Patient Care Document: Safety and After Care Planning:          Safety Plan Provided? No    Follow-Up Plans and Providers: Pt is currently on observation status and follow up plan has not been created at this time.      Duration  of face to face time with patient in minutes: 1.25 hrs    CPT code(s) utilized: 75711 - Psychotherapy for Crisis - 60 (30-74*) min      Sue Felton [facility]

## 2021-05-23 VITALS
HEART RATE: 66 BPM | SYSTOLIC BLOOD PRESSURE: 111 MMHG | OXYGEN SATURATION: 97 % | TEMPERATURE: 97.9 F | DIASTOLIC BLOOD PRESSURE: 76 MMHG | RESPIRATION RATE: 16 BRPM | WEIGHT: 167 LBS | HEIGHT: 67 IN | BODY MASS INDEX: 26.21 KG/M2

## 2021-05-23 PROCEDURE — 99217 PR OBSERVATION CARE DISCHARGE: CPT | Performed by: PSYCHIATRY & NEUROLOGY

## 2021-05-23 PROCEDURE — G0378 HOSPITAL OBSERVATION PER HR: HCPCS

## 2021-05-23 PROCEDURE — 250N000013 HC RX MED GY IP 250 OP 250 PS 637: Performed by: PSYCHIATRY & NEUROLOGY

## 2021-05-23 RX ORDER — QUETIAPINE FUMARATE 25 MG/1
25 TABLET, FILM COATED ORAL 2 TIMES DAILY
Qty: 60 TABLET | Refills: 0 | Status: SHIPPED | OUTPATIENT
Start: 2021-05-23 | End: 2021-09-11

## 2021-05-23 RX ADMIN — QUETIAPINE FUMARATE 25 MG: 25 TABLET ORAL at 11:33

## 2021-05-23 RX ADMIN — Medication 50 MCG: at 09:15

## 2021-05-23 RX ADMIN — FLUVOXAMINE MALEATE 200 MG: 100 TABLET ORAL at 09:29

## 2021-05-23 ASSESSMENT — ACTIVITIES OF DAILY LIVING (ADL)
DRESS: INDEPENDENT;STREET CLOTHES
ORAL_HYGIENE: INDEPENDENT
HYGIENE/GROOMING: INDEPENDENT

## 2021-05-23 NOTE — ED PROVIDER NOTES
"ED Observation Psychiatric Discharge Summary   [facility] Emergency Department - EmPATH Unit  Discharge Date: 5/23/2021    Katty Pavon MRN: [id number]   Age: 41 year old YOB: 1980     Brief HPI & Initial ED Course     Chief Complaint   Patient presents with     Psychiatric Evaluation     HPI  Katty Pavon is a 41 year old female with PMH notable for OCD and depression who presented to the ED with a psychiatric concern.  Her OCD is getting worse secondary to more stress.       The patient was evaluated in the emergency department by a physician and DEC behavioral health . The DEC  recommended an obs stay with looking at a more intensive treatment discharge from the hospital. See separate DEC note from this encounter for details on the assessment. The patient's psychiatric state was such that she would benefit from ongoing monitoring. Observation care was initiated with the plan including serial assessments of psychiatric condition, potential administration of medications if indicated, and further disposition pending the patient's psychiatric course during the monitoring period.     See ED Observation H&P for further details on the patient's presenting history and initial evaluation.     Physical Examination   BP: 111/76  Pulse: 66  Temp: 97.9  F (36.6  C)  Resp: 16  Height: 170.2 cm (5' 7\")  Weight: 75.8 kg (167 lb)  SpO2: 97 %    Physical Exam  General: Appears stated age.   Neuro: alert and fully oriented. CN II-XII grossly intact. Grossly normal strength in all extremities.   Integumentary/Skin: no rash visualized, normal color    Psychiatric Examination   Appearance: awake, alert  Attitude:  cooperative  Eye Contact:  good  Mood:  anxious  Affect:  appropriate and in normal range  Speech:  clear, coherent  Psychomotor Behavior:  no evidence of tardive dyskinesia, dystonia, or tics  Throught Process:  logical, linear and goal oriented  Associations:  no loose " associations  Thought Content:  no evidence of suicidal ideation or homicidal ideation and no evidence of psychotic thought  Insight:  good  Judgement:  intact  Oriented to:  time, person, and place  Attention Span and Concentration:  intact  Recent and Remote Memory:  intact    Results        Labs Ordered and Resulted from Time of ED Arrival Up to the Time of Departure from the ED   SARS-COV-2 (COVID-19) VIRUS RT-PCR       Observation Course   The patient was found to have a psychiatric condition that would benefit from an observation stay in the emergency department for further psychiatric stabilization and/or coordination of a safe disposition. The plan upon observation admission included serial assessments of psychiatric condition, potential administration of medications if indicated, further disposition pending the patient's psychiatric course during the monitoring period.     Serial assessments of the patient's psychiatric condition were performed. Nursing notes were reviewed. During the observation period, the patient did not require medications for agitation, and did not require restraints/seclusion for patient and/or provider safety.     After the period in observation care, the patient's circumstances and mental state were safe for outpatient management. After counseling on the diagnosis, work-up, and treatment plan, the patient was discharged. Close follow-up with her psychiatrist and therapist was recommended and community partial hospitalization resources were provided. Patient is to return to the ED if any urgent or potentially life-threatening concerns.      [facility] is a good fit for Katty due to their specialization in OCD.  She has looked into this several times but is afraid to take time off work to do the program.  She was encouraged to do so.  She will start seroquel 25 mg bid as it did help with sleep last night and she is feeling better than when she came in.   She is not suicidal.  The passive thoughts from yesterday have passed.  [facility] will call tomorrow to assist any way that they can in helping get an intake at [facility]. She understands the plan and agrees.     Discharge Diagnoses:   Final diagnoses:   Obsessive-compulsive disorder, unspecified type   Suicidal ideation       --  Seth Donahue MD  [facility] EMERGENCY DEPT  EmPATH Unit  5/23/2021      Seth Donahue MD  05/23/21 2209

## 2021-05-23 NOTE — ED NOTES
"[facility] Reassessment     Presenting issue that brought patient to the emPATH unit: Per [facility] assessment on 5/22, written by NATHALIA Watson: Pt reports she took a 5 hour shower today. She reports having contamination Obsessive Compulsive Disorder. Her friend was in town and was concerned about Katty's mental health due to being in the shower for 5 hours due to her feelings of contamination. Pt stated she began to experience self loathing while in the shower and felt upset that her symptoms haven't improved even after participating in therapy and psychiatry services for an extended period of time. She began to have thoughts such as \"people would be better off without me\" and \"I am a burden\". She denied suicidal plan or intent but acknowledge passive suicidal ideation.    Current presentation on the unit: Katty presents today with the same concerns related to OCD. She continues to deny SI plan, method or intent. She reports that yesterday she was feeling much more \"down, burdensome and hopeless.\" Katty reports that she is feels ready to discharge from [facility] today. Katty states that she is beginning to understand that she might need to take extended time off of work in order to participate in IOP or PHP and is more willing to take this step today. Katty reports that her friend who brought her to the ED yesterday is still in town and is able to continue to provide support.     Current risk to self or others? No    Summary of interventions completed with patient: active listening, establishing rapport, discharge planning, safety planning, and motivational interviewing.     Additional collateral information: none obtained    Disposition: Programmatic care: Katty will follow up with [facility] for IOP or PHP for OCD sx. Per Katty's request, writer also provided resource information for Mindfulness-Based Stress Reduction courses through [facility] or [facility].     Rationale for " disposition: Writer and attending provider offered to refer Katty to other IOP or PHP through [facility] or [facility] referral. Katty declined, stating that she will follow up with [facility] on her own after discharge. Katty states that she prefers [facility] because they have therapy providers specifically trained to work with OCD.     Reviewed assessment with attending provider: Yes, Dr. Donahue      SAFETY PLAN:  If I am feeling unsafe or I am in a crisis, I will:   Contact my established care providers   Call the [place] Suicide Prevention Lifeline: [phone]   Go to the nearest emergency room   Call 575     Warning signs that I or other people might notice when a crisis is developing for me: increased compulsions, increased worry and stress  Things I am able to do on my own to cope or help me feel better: utilize skills learned in therapy, reach out to friends for support  Things that I am able to do with others to cope or help me better: connect with my friends or therapists for support  Changes I can make to support my mental health and wellness: follow up with [facility] to start process of attending IOP or PHP   People in my life that I can ask for help: family and friends - Edmund  [facility] has a mental health crisis team you can call 24/7: [facility] Crisis Line Number: [phone]  Additional resources and information: Per Dr. Donahue: Start seroquel, 25mg twice a day        Total time spent with patient:.50 hrs    CPT code: 31041 - Psychotherapy (with patient) - 30 (16-37*) min     Katty Mckeon DONNA

## 2021-05-23 NOTE — PLAN OF CARE
Pt is feeling better this morning and well rested. Pt stated she had good night last and was able to sleep. Pt kayley being here and getting away from some of the stress has helped and is not sure if the medications have helped or the change in environment. Pt remains pressured in her speech but her intensity is less. Pt talked with staff about possibly doing a day treatment program or an IOP. Pt is feeling more open to this option as her OP resources have been exhausted.Pt plans on seeking out [facility] Day treatment program as this program specializes in OCD symptoms. Pt denies any SI and identifies her  and friends as very supportive of her and her mental health. Pt is pleasant and cooperative with staff. Discharge instructions reviewed with patient including follow-up care plan. Educated on medication regime and advised not to stop prescribed medication without consulting their physician. Reviewed safety plan and outpatient resources.All belongings which where brought into the hospital have been returned to patient. Escorted off the unit at 1432. Discharged to home w/ .

## 2021-05-23 NOTE — ED NOTES
EmPATH Treatment Plan    Client's Name: Katty Pavon  YOB: 1980    DSM-5 Diagnoses: F42.2 Obsessive Compulsive Disorder  Psychosocial / Contextual Factors: Patient presented to the emPATH unit with the following concerns: anxiety, racing thoughts, passive suicidal thoughts    Anticipated number of sessions or this episode of care: 2      MeasurableTreatment Goal(s) related to diagnosis / functional impairment(s)    Goal 1: Patient will create safety plan and prepare for discharge    Objective #A     Patient will identify next steps and services to pursue after discharge  Status: New as of May 22, 2021    Intervention(s)  LMHP will provide options and discuss Day Treatment Options    Objective #B  Patient will identify supports and current coping skills.  Status: New as of May 22, 2021    Intervention(s)  LMHP will support pt in exploring current resources.    Objective #C  Patient will learn 1 new coping skills for anxiety  Status: New as of May 22, 2021    Intervention(s)  LMHP will Teach 1 new coping skill for anxiety                Appearance:   Appropriate    Eye Contact:   Good    Psychomotor Behavior: Normal    Attitude:   Cooperative    Orientation:   All   Speech    Rate / Production: Hyperverbal  Pressured  Normal     Volume:  Normal    Mood:    Anxious  Depressed  Normal Sad    Affect:    Appropriate    Thought Content:  Clear    Thought Form:  Coherent  Flight of Ideas  Logical    Insight:    Good           PLAN:   Patient will board in emPATH until patient and treatment deem it appropriate to either discharge or admit to a higher level of care. Details: Pt is currently on observation status and will likely discharge home tomorrow to more intensive therapeutic services.    Patient has reviewed and agreed to the above plan.    Sue Felton, [facility]                                                           ________

## 2021-05-23 NOTE — ED NOTES
Patient sleeping for an hour and half after 8pm.  Woke up briefly but went right back to sleep.  Explained to the patient that if she did not fall asleep she could have 25mg of seroquel and it is available every 4 hours as needed.  She has been sleeping so no further seroquel given.

## 2021-05-23 NOTE — DISCHARGE INSTRUCTIONS
Start seroquel 25 mg twice a day    Follow up with your psychiatrist as soon as possible    Follow up with your therapist    Strongly recommend a partial hospitalization program.  [facility] is a good fit due to their specialization in OCD.      SAFETY PLAN:  If I am feeling unsafe or I am in a crisis, I will:   Contact my established care providers   Call the National Suicide Prevention Lifeline: [phone]   Go to the nearest emergency room   Call 501     Warning signs that I or other people might notice when a crisis is developing for me: increased compulsions, increased worry and stress  Things I am able to do on my own to cope or help me feel better: utilize skills learned in therapy, reach out to friends for support  Things that I am able to do with others to cope or help me better: connect with my friends or therapists for support  Changes I can make to support my mental health and wellness: follow up with Jose Antonio to start process of attending IOP or PHP    People in my life that I can ask for help: family and friends - Edmund  Pending sale to [facility] has a mental health crisis team you can call 24/7: [facility] Crisis Line Number: [phone]  Additional resources and information: Per Dr. Donahue: Start seroquel, 25mg twice a day

## 2021-05-23 NOTE — ED NOTES
Patient denies suicidal ideation.  Patient endorsed anxiety early in the shift but stated it was better. She also endorsed depression.  She did fall asleep a couple of times before dinner.  She was given vit d, luvox and 12.5mg of seroquel after dinner.  Patient did not fall asleep but stated that she felt calm and that if there was something to cause anxiety it would be harder for her to get to a high level of anxiety.  She is very talkative and pleasant.

## 2021-09-05 ENCOUNTER — HEALTH MAINTENANCE LETTER (OUTPATIENT)
Age: 41
End: 2021-09-05

## 2021-09-08 NOTE — PATIENT INSTRUCTIONS
"\" Suzanna James - Quiet Your Mind and Get to Sleep (good for all people and people with comorbidities)   \" MARIBETH Yin & Lou Richard - Good Night Mind:  Turn Off your Noisy Thoughts and Get a Good Night's Sleep   \" Daniel Winter - Overcoming Insomnia and Sleep Problems:  A Self Help Guide Using Cognitive-Behavioral Techniques       Free well-validated noah:       \" CBTI  - free, designed as an adjunctive to CBTI - can use for diary on the phone           You can also refer patients directly to our [facility] online CBT for insomnia program called Go[facility].  The program is in partnership with [facility].  The cost is $40 and your patients can sign up using the link   [email or web address]     Preventive Health Recommendations  Female Ages 40 to 49    Yearly exam:     See your health care provider every year in order to  1. Review health changes.   2. Discuss preventive care.    3. Review your medicines if your doctor prescribed any.      Get a Pap test every three years (unless you have an abnormal result and your provider advises testing more often).      If you get Pap tests with HPV test, you only need to test every 5 years, unless you have an abnormal result. You do not need a Pap test if your uterus was removed (hysterectomy) and you have not had cancer.      You should be tested each year for STDs (sexually transmitted diseases), if you're at risk.     Ask your doctor if you should have a mammogram.      Have a colonoscopy (test for colon cancer) if someone in your family has had colon cancer or polyps before age 50.       Have a cholesterol test every 5 years.       Have a diabetes test (fasting glucose) after age 45. If you are at risk for diabetes, you should have this test every 3 years.    Shots: Get a flu shot each year. Get a tetanus shot every 10 years.     Nutrition:     Eat at least 5 servings of fruits and vegetables each day.    Eat whole-grain bread, " whole-wheat pasta and brown rice instead of white grains and rice.    Get adequate Calcium and Vitamin D.      Lifestyle    Exercise at least 150 minutes a week (an average of 30 minutes a day, 5 days a week). This will help you control your weight and prevent disease.    Limit alcohol to one drink per day.    No smoking.     Wear sunscreen to prevent skin cancer.    See your dentist every six months for an exam and cleaning.

## 2021-09-08 NOTE — PROGRESS NOTES
"   SUBJECTIVE:   CC: {Katty Pavon is an {41 year old woman who presents for preventive health visit.     Her last CPE was ***. She has a history of ***. She has the following concerns she would like addressed today:    Multiple issues she would like to discuss outside of CPE. Split billing discussed. She sent lengthy [employer] message which was reviewed.    Insomnia:  Chronic sleep problems as well as persistent fatigue throughout the day.   Fatigue even after a full nights sleep. No snoring or observed sleep apnea. Has been taling melatonin.      Onset: Whole life.  Sleeping    Description:   Time to fall asleep (sleep latency): 30 minutes  Middle of night awakening:  YES- ***  Early morning awakening:  YES    Progression of Symptoms:  Worsening recently.  It is not the worst it has been    Accompanying Signs & Symptoms:  Daytime sleepiness/napping: YES- extreme fatigue and napping 3-4 times per week 1-3 hours  Excessive snoring/apnea: { :236108::\"no\"}  Restless legs: { :820019::\"no\"}  Frequent urination: { :850021::\"no\"}  Chronic pain:  { :862450::\"no\"}    History:  Prior Insomnia: { :022955::\"no\"}    Precipitating factors:   New stressful situation: { :713207::\"no\"}  Caffeine intake: no   OTC decongestants: no   Any new medications: YES- luvox    Alleviating factors:  Self medicating (alcohol, etc.):  {.:673085::\"no\"}  Therapies Tried and outcome: ***    Sweating and hot flashes:  She is concerned this may represent early menapause though she gets a period every month.  She hsa been on the luvox ***.  KNown side effect of diaphoresis discussed. Will consider checking TSH.      Gut issues and concern for food intolerance.  Requests testing.  IBS-D  Gas bloating.  Diarrhea, nausea and vomiting. Has had endoscopy. Has seen gastroenterologist 11/2020.       Onset: ***    Description:   Character: {.:148192}  Location: {.:781532}  Radiation: {.:090728::\"None\"}    Intensity: {.:860063}    Progression of Symptoms:  " "{.:343125}    Accompanying Signs & Symptoms:  Fever/Chills?: { :082760}  Gas/Bloating: { :175973}  Nausea: { :151174}  Vomitting: { :127894}  Diarrhea?: { :546385}  Constipation:{ :839486}  Dysuria or Hematuria: { :216765}   History:   Trauma: { :938701}  Previous similar pain: { :051828}   Previous tests done: { .:549301}    Precipitating factors:   Does the pain change with:     Food: { :473062}     BM: { :551894}    Urination: { :701094}    Alleviating factors:  ***    Therapies Tried and outcome: ***    LMP:  {NOT applicable:663660::\"not applicable\"}       GYN history:  Concern:      Periods: ***regular, lasting ***days.  Flow described as ***  *** dysmenorrhea, *** Dysparuneia  Currently sexually active: ***  G***P***  Contraception: ***  {No LMP recorded.}  Vaginal symptoms: ***  Concern for STD: ***    Accepts/requests STD testing: ***  History of abnormal Pap smear: {PAP HX:330587}    Health maintenance:  Mammogram: ***  Colonoscopy: ***  PAP:   Lab Results   Component Value Date    PAP NIL 07/19/2016       Immunizations:  ***  ***    Healthy Habits:    Do you get at least three servings of calcium containing foods daily (dairy, green leafy vegetables, etc.)? { :004875::\"yes\"}    Amount of exercise or daily activities, outside of work: { :773469}    Problems taking medications regularly { :658030::\"No\"}    Medication side effects: { :108302::\"No\"}    Have you had an eye exam in the past two years? { :469127}    Do you see a dentist twice per year? { :073987}    Do you have sleep apnea, excessive snoring or daytime drowsiness?{ :720489}  {Outside tests to abstract? :051664}    {additional problems to add (Optional):457644}    PHQ-2 Score:     PHQ-2 ( 1999 Pfizer) 4/27/2018 7/3/2017   Q1: Little interest or pleasure in doing things 0 0   Q2: Feeling down, depressed or hopeless 0 0   PHQ-2 Score 0 0          Patient Active Problem List    Diagnosis Date Noted     Suicidal ideation 05/22/2021     Priority: " Medium     Moderate episode of recurrent major depressive disorder (H) 12/27/2019     Priority: Medium     Followed by psychiatry       OCD (obsessive compulsive disorder) 07/03/2012     Priority: Medium     Eczema 07/03/2012     Priority: Medium       Past Medical History:   Diagnosis Date     Anxiety      Depression      OCD (obsessive compulsive disorder)        Past Surgical History:   Procedure Laterality Date     ESOPHAGOSCOPY, GASTROSCOPY, DUODENOSCOPY (EGD), COMBINED  12/13/2013    Procedure: COMBINED ESOPHAGOSCOPY, GASTROSCOPY, DUODENOSCOPY (EGD);;  Surgeon: Qasim Kennedy MD;  Location:  GI     EXTRACTION(S) DENTAL         Family History   Problem Relation Age of Onset     Skin Cancer Paternal Grandmother         Not Melanoma     Thyroid Cancer Paternal Grandmother      Alzheimer Disease Paternal Grandmother      Bladder Cancer Paternal Grandfather         Advanced     Depression Paternal Grandfather      Hyperlipidemia Father      Anxiety Disorder Father      Obesity Father      Unknown/Adopted Mother        Social History     Tobacco Use     Smoking status: Current Every Day Smoker     Packs/day: 1.00     Types: Cigarettes     Smokeless tobacco: Never Used     Tobacco comment: very very rarely   Substance Use Topics     Alcohol use: Yes     Alcohol/week: 4.2 standard drinks     Types: 5 Standard drinks or equivalent per week     Comment: rare       Social History     Social History Narrative    Lives with  and his son aged 18. No pets.  Life is going OK but very stressful past 10 years with brutal custody espinoza over husbands 5 children who have struggled with substance abuse.        Has a good support system.    Feels safe in all environments.    Wears seatbelt 100% of the time    Does not bike. Would wear a helmet if she biked.    Denies history of abuse, past or present, physical, sexual or emotional.    Basilia Chavira PA-C    12/19/19           Current Outpatient Medications  "  Medication Sig Dispense Refill     ALPRAZolam (XANAX) 1 MG tablet Take 1 mg by mouth 3 times daily as needed for anxiety       FLUOXETINE HCL PO Take 40 mg by mouth        fluvoxaMINE (LUVOX) 50 MG tablet Take 200 mg by mouth At Bedtime        melatonin 3 MG tablet As needed       ondansetron (ZOFRAN-ODT) 8 MG ODT tab Take 1 tablet (8 mg) by mouth every 8 hours as needed for nausea 30 tablet 3     QUEtiapine (SEROQUEL) 25 MG tablet Take 1 tablet (25 mg) by mouth 2 times daily 60 tablet 0     VITAMIN D PO                             Reviewed orders with patient.  Reviewed health maintenance and updated orders accordingly - {Yes/No:330778::\"Yes\"}  {Chronicprobdata (Optional):246281}              ROS:  { :438768}    OBJECTIVE:   There were no vitals taken for this visit.    EXAM:  {Exam Choices:117852}      ASSESSMENT/PLAN:   {Diag Picklist:927882}    COUNSELING:   {FEMALE COUNSELING MESSAGES:130188::\"Reviewed preventive health counseling, as reflected in patient instructions\"}    BP Readings from Last 3 Encounters:   05/23/21 111/76   12/19/19 116/77   04/27/18 133/80      There is no height or weight on file to calculate BMI.      {BP Counseling- Complete if BP >= 120/80  (Optional):416525}  {Weight Management Plan (ACO) Complete if BMI is abnormal-  Ages 18-64  BMI >24.9.  Age 65+ with BMI <23 or >30 (Optional):235289}    History   Smoking Status     Current Every Day Smoker     Packs/day: 1.00     Types: Cigarettes   Smokeless Tobacco     Never Used     Comment: very very rarely        {Tobacco Cessation -- Complete if patient is a smoker (Optional):907672}    Counseling Resources:  ATP IV Guidelines  Pooled Cohorts Equation Calculator  Breast Cancer Risk Calculator  FRAX Risk Assessment  ICSI Preventive Guidelines  Dietary Guidelines for Americans, 2010  USDA's MyPlate  ASA Prophylaxis  Lung CA Screening    Yuni Chavira PA-C  [facility]    "

## 2021-09-09 ENCOUNTER — OFFICE VISIT (OUTPATIENT)
Dept: FAMILY MEDICINE | Facility: CLINIC | Age: 41
End: 2021-09-09
Payer: COMMERCIAL

## 2021-09-09 VITALS
BODY MASS INDEX: 27.03 KG/M2 | HEIGHT: 67 IN | DIASTOLIC BLOOD PRESSURE: 71 MMHG | TEMPERATURE: 97.1 F | OXYGEN SATURATION: 95 % | HEART RATE: 66 BPM | WEIGHT: 172.25 LBS | SYSTOLIC BLOOD PRESSURE: 108 MMHG

## 2021-09-09 DIAGNOSIS — R53.83 FATIGUE, UNSPECIFIED TYPE: Primary | ICD-10-CM

## 2021-09-09 DIAGNOSIS — Z11.59 ENCOUNTER FOR HEPATITIS C SCREENING TEST FOR LOW RISK PATIENT: ICD-10-CM

## 2021-09-09 DIAGNOSIS — Z13.220 SCREENING FOR LIPID DISORDERS: ICD-10-CM

## 2021-09-09 DIAGNOSIS — R14.0 ABDOMINAL BLOATING: ICD-10-CM

## 2021-09-09 DIAGNOSIS — T50.905A HOT FLASH DUE TO MEDICATION: ICD-10-CM

## 2021-09-09 DIAGNOSIS — F51.01 PRIMARY INSOMNIA: ICD-10-CM

## 2021-09-09 DIAGNOSIS — R23.2 HOT FLASH DUE TO MEDICATION: ICD-10-CM

## 2021-09-09 LAB
ALBUMIN SERPL-MCNC: 3.2 G/DL (ref 3.4–5)
ALP SERPL-CCNC: 54 U/L (ref 40–150)
ALT SERPL W P-5'-P-CCNC: 21 U/L (ref 0–50)
ANION GAP SERPL CALCULATED.3IONS-SCNC: 6 MMOL/L (ref 3–14)
AST SERPL W P-5'-P-CCNC: 14 U/L (ref 0–45)
BILIRUB SERPL-MCNC: 0.3 MG/DL (ref 0.2–1.3)
BUN SERPL-MCNC: 16 MG/DL (ref 7–30)
CALCIUM SERPL-MCNC: 8.6 MG/DL (ref 8.5–10.1)
CHLORIDE BLD-SCNC: 109 MMOL/L (ref 94–109)
CHOLEST SERPL-MCNC: 190 MG/DL
CO2 SERPL-SCNC: 26 MMOL/L (ref 20–32)
CREAT SERPL-MCNC: 0.85 MG/DL (ref 0.52–1.04)
ERYTHROCYTE [DISTWIDTH] IN BLOOD BY AUTOMATED COUNT: 11.9 % (ref 10–15)
GFR SERPL CREATININE-BSD FRML MDRD: 84 ML/MIN/1.73M2
GLUCOSE BLD-MCNC: 103 MG/DL (ref 70–99)
HCT VFR BLD AUTO: 43.9 % (ref 35–47)
HDLC SERPL-MCNC: 41 MG/DL
HGB BLD-MCNC: 15 G/DL (ref 11.7–15.7)
LDLC SERPL CALC-MCNC: 120 MG/DL
MCH RBC QN AUTO: 31.1 PG (ref 26.5–33)
MCHC RBC AUTO-ENTMCNC: 34.2 G/DL (ref 31.5–36.5)
MCV RBC AUTO: 91 FL (ref 78–100)
NONHDLC SERPL-MCNC: 149 MG/DL
PLATELET # BLD AUTO: 299 10E3/UL (ref 150–450)
POTASSIUM BLD-SCNC: 4.3 MMOL/L (ref 3.4–5.3)
PROT SERPL-MCNC: 6.4 G/DL (ref 6.8–8.8)
RBC # BLD AUTO: 4.83 10E6/UL (ref 3.8–5.2)
SODIUM SERPL-SCNC: 141 MMOL/L (ref 133–144)
TRIGL SERPL-MCNC: 143 MG/DL
TSH SERPL DL<=0.005 MIU/L-ACNC: 2.21 MU/L (ref 0.4–4)
WBC # BLD AUTO: 6.7 10E3/UL (ref 4–11)

## 2021-09-09 PROCEDURE — 86803 HEPATITIS C AB TEST: CPT | Performed by: PHYSICIAN ASSISTANT

## 2021-09-09 PROCEDURE — 80061 LIPID PANEL: CPT | Performed by: PHYSICIAN ASSISTANT

## 2021-09-09 PROCEDURE — 80053 COMPREHEN METABOLIC PANEL: CPT | Performed by: PHYSICIAN ASSISTANT

## 2021-09-09 PROCEDURE — 84443 ASSAY THYROID STIM HORMONE: CPT | Performed by: PHYSICIAN ASSISTANT

## 2021-09-09 ASSESSMENT — MIFFLIN-ST. JEOR: SCORE: 1468.95

## 2021-09-09 ASSESSMENT — ANXIETY QUESTIONNAIRES
3. WORRYING TOO MUCH ABOUT DIFFERENT THINGS: SEVERAL DAYS
7. FEELING AFRAID AS IF SOMETHING AWFUL MIGHT HAPPEN: NOT AT ALL
GAD7 TOTAL SCORE: 2
5. BEING SO RESTLESS THAT IT IS HARD TO SIT STILL: NOT AT ALL
IF YOU CHECKED OFF ANY PROBLEMS ON THIS QUESTIONNAIRE, HOW DIFFICULT HAVE THESE PROBLEMS MADE IT FOR YOU TO DO YOUR WORK, TAKE CARE OF THINGS AT HOME, OR GET ALONG WITH OTHER PEOPLE: SOMEWHAT DIFFICULT
2. NOT BEING ABLE TO STOP OR CONTROL WORRYING: NOT AT ALL
6. BECOMING EASILY ANNOYED OR IRRITABLE: NOT AT ALL
1. FEELING NERVOUS, ANXIOUS, OR ON EDGE: SEVERAL DAYS

## 2021-09-09 ASSESSMENT — PATIENT HEALTH QUESTIONNAIRE - PHQ9
SUM OF ALL RESPONSES TO PHQ QUESTIONS 1-9: 8
5. POOR APPETITE OR OVEREATING: NOT AT ALL

## 2021-09-09 NOTE — NURSING NOTE
"41 year old  Chief Complaint   Patient presents with     Obsessive Compulsive Disorder     OCD      Thyroid Problem     Menopausal Sx     night sweats,      Gastrointestinal Problem     vasovagel        Blood pressure 108/71, pulse 66, temperature 97.1  F (36.2  C), temperature source Temporal, height 1.702 m (5' 7\"), weight 78.1 kg (172 lb 4 oz), SpO2 95 %, not currently breastfeeding. Body mass index is 26.98 kg/m .  Patient Active Problem List   Diagnosis     OCD (obsessive compulsive disorder)     Eczema     Moderate episode of recurrent major depressive disorder (H)     Suicidal ideation       Wt Readings from Last 2 Encounters:   09/09/21 78.1 kg (172 lb 4 oz)   05/22/21 75.8 kg (167 lb)     BP Readings from Last 3 Encounters:   09/09/21 108/71   05/23/21 111/76   12/19/19 116/77         Current Outpatient Medications   Medication     fluvoxaMINE (LUVOX) 50 MG tablet     VITAMIN D PO     ALPRAZolam (XANAX) 1 MG tablet     FLUOXETINE HCL PO     melatonin 3 MG tablet     ondansetron (ZOFRAN-ODT) 8 MG ODT tab     QUEtiapine (SEROQUEL) 25 MG tablet     No current facility-administered medications for this visit.       Social History     Tobacco Use     Smoking status: Current Every Day Smoker     Packs/day: 1.00     Types: Cigarettes     Smokeless tobacco: Never Used     Tobacco comment: very very rarely   Substance Use Topics     Alcohol use: Yes     Alcohol/week: 4.2 standard drinks     Types: 5 Standard drinks or equivalent per week     Comment: rare     Drug use: No       Health Maintenance Due   Topic Date Due     ADVANCE CARE PLANNING  Never done     DEPRESSION ACTION PLAN  Never done     Pneumococcal Vaccine: Pediatrics (0 to 5 Years) and At-Risk Patients (6 to 64 Years) (1 of 2 - PPSV23) Never done     HEPATITIS C SCREENING  Never done     PHQ-9  09/24/2020     INFLUENZA VACCINE (1) 09/01/2021     HPV TEST  07/19/2021     PAP  07/19/2021       Lab Results   Component Value Date    PAP NIL 07/19/2016 "         September 9, 2021 9:58 AM

## 2021-09-10 LAB — HCV AB SERPL QL IA: REACTIVE

## 2021-09-10 ASSESSMENT — ANXIETY QUESTIONNAIRES: GAD7 TOTAL SCORE: 2

## 2021-09-11 RX ORDER — FLUVOXAMINE MALEATE 100 MG
TABLET ORAL
COMMUNITY
Start: 2021-08-21 | End: 2022-12-08

## 2021-09-11 NOTE — PROGRESS NOTES
Assessment & Plan     (R53.83) Fatigue, unspecified type  (primary encounter diagnosis)  Plan: CBC with Diff Plt ([facility]), Comprehensive         metabolic panel, TSH with free T4 reflex      We discussed multifactorial reasons for daytime fatigue.  Her medication certainly could be playing a role as well as her mood and longstanding history of insomnia.  Encourage her to avoid taking daytime naps and work on sleep hygiene.  Labs as noted above just to make sure we are not missing anything metabolically.    (F51.01) Primary insomnia  See patient instructions resources on CBT-I encouraged and recommended.      (R14.0) Abdominal bloating  Long discussion about food allergies, and sensitivities as well as IBS.  We talked about the utility of food intolerance testing. Increase fiber with fiber gummies. Drink plenty of fluids.    (R23.2,  T50.905A) Hot flash due to medication  Comment: Talked about the different causes of hot flashes.  Because of patient's age, regular periods and the severity of her hot flashes I am suspicious that they are due to her medications.  It is a known side effect of Luvox.  She recently decreased the dose from 200 mg a day to 100 and will see if that produces improvement in her symptoms.    (Z13.220) Screening for lipid disorders  Plan: Lipid Panel    (Z11.59) Encounter for hepatitis C screening test for low risk patient  Plan: Hepatitis C Screen Reflex to HCV RNA Quant and         Genotype, Hepatitis C RNA, Quantitative by PCR         with Confirmatory Reflex to Genotyping         Since patient was having lab test done I reviewed health maintenance with her and appropriate labs were placed.  She will return in the next few months for her preventive exam.  She is due for a Pap test and she was in agreement with this suggestion.                   Return in about 3 months (around 12/9/2021) for Routine preventive, with me.    Yuni Chavira PA-C  [facility]    Subjective    Katty is a 41 year old who presents for the following health issues     HPI     Katty initially made this appointment for a physical.  She sent me a lengthy MyChart message with several additional issues that she was wanting to discuss.  It was our mutual decision to forego a physical today and talk about the issues that she had concerns with and she will schedule back for her preventive exam.    Extreme fatigue: Katty is concerned about the level of fatigue that she feels on a daily basis.  She wakes up feeling tired does not feel that she is sleeping restfully.  This is not necessarily a new concern but has become more prominent over the past couple months.    Insomnia: Longstanding history of insomnia.  She describes difficulty falling asleep, staying asleep and with early morning awakening.  She does describe that she is currently taking a nap most days anywhere from 1 to 3 hours.  She is aware of sleep hygiene but does not always follow through.    Hot flashes: Katty is concerned about hot flashes.  She describes this as intense episodes of sweating.  It usually occurs at night and she will wake up hot and sweaty.  This occasionally occurs during the day but usually at night.  Patient has a very regular..  She is wondering if hormonal testing would be appropriate as she is concerned she may be in early menopause.    Abdominal gas and bloating: Katty has been diagnosed with IBS.  She is wondering if she should seek food intolerance testing.  She has been tested for celiac in the past.  Her sister has celiac disease but Katty tested negative.  Katty reports she is most certainly lactose intolerant.  She has removed all dairy from her diet.  She continues to have some stomach issues despite this.  She describes bloating and cramping.  One of her obsessions is regarding bowel movements. She has seen GI provider 11/2020.    OCD and depression: Past medical history review shows a significant history  "for depression, anxiety and lifelong OCD.  Katty reports that she was recently seen in the behavioral emergency center in May and just completed intensive outpatient therapy at [place].  She feels she is now more able to deal with her OCD.  She has a psychiatrist, therapist and they recently decreased her Luvox from 200 mg daily to 100 mg.  Associated with this to help with her sleep she has taken Seroquel but she is not taking it currently.  She reports that she has discussed her sleep and fatigue with her psychiatric providers.  PHQ 12/19/2019 8/25/2020 9/9/2021   PHQ-9 Total Score 14 19 8   Q9: Thoughts of better off dead/self-harm past 2 weeks Not at all Not at all Not at all     NAOMI-7 SCORE 12/19/2019 8/25/2020 9/9/2021   Total Score - 14 (moderate anxiety) -   Total Score 11 14 2           Review of Systems   Constitutional, HEENT, cardiovascular, pulmonary, gi and gu systems are negative, except as otherwise noted.      Objective    /71 (BP Location: Left arm, Patient Position: Sitting, Cuff Size: Adult Regular)   Pulse 66   Temp 97.1  F (36.2  C) (Temporal)   Ht 1.702 m (5' 7\")   Wt 78.1 kg (172 lb 4 oz)   SpO2 95%   BMI 26.98 kg/m    Body mass index is 26.98 kg/m .  Physical Exam   GENERAL: healthy, alert and no distress  NECK: no adenopathy, no asymmetry, masses, or scars and thyroid normal to palpation  RESP: lungs clear to auscultation - no rales, rhonchi or wheezes  CV: regular rate and rhythm, normal S1 S2, no S3 or S4, no murmur, click or rub, no peripheral edema and peripheral pulses strong  ABDOMEN: soft, nontender, no hepatosplenomegaly, no masses and bowel sounds normal  MS: no gross musculoskeletal defects noted, no edema  SKIN: no suspicious lesions or rashes  PSYCH: well dressed and groomed.  Good eye contact and is cooperative. Thoughts linear.  No delusions, compulsions or paranoia.  Affect bright.  Katty shows significant insight into her mental health.  She appears almost " hypomanic by talking quite a bit.  Patient denies homicidal and suicidal ideation as well as no thoughts or actions of self-harm.

## 2021-09-13 LAB — HCV RNA SERPL NAA+PROBE-ACNC: NOT DETECTED IU/ML

## 2021-09-14 ENCOUNTER — TELEPHONE (OUTPATIENT)
Dept: FAMILY MEDICINE | Facility: CLINIC | Age: 41
End: 2021-09-14

## 2021-09-14 NOTE — TELEPHONE ENCOUNTER
Called patient, she did schedule an appointment to have the area of the concern assessed. She denies spreading redness, red streak, swelling. Endorses painful area that is pink in color. No known injury, cut, bite. Feels like a bruise, but is not bruised. 6 days of this without improvement. Reviewed symptoms requiring immediate medical attention. Pt verbalized understanding.   Erin Arciniega MS RN-BC  09/14/21  3:30 PM

## 2021-09-14 NOTE — TELEPHONE ENCOUNTER
Who is calling? Patient   Reason for Call: regarding MyChart message (strange bruise) -- Request a call back from RN    scheduled her for tomorrow (9/15) with Willian

## 2021-09-15 ENCOUNTER — OFFICE VISIT (OUTPATIENT)
Dept: FAMILY MEDICINE | Facility: CLINIC | Age: 41
End: 2021-09-15
Payer: COMMERCIAL

## 2021-09-15 VITALS
HEART RATE: 76 BPM | HEIGHT: 67 IN | BODY MASS INDEX: 26.68 KG/M2 | RESPIRATION RATE: 15 BRPM | OXYGEN SATURATION: 96 % | WEIGHT: 170 LBS | DIASTOLIC BLOOD PRESSURE: 78 MMHG | SYSTOLIC BLOOD PRESSURE: 118 MMHG | TEMPERATURE: 97.2 F

## 2021-09-15 DIAGNOSIS — L08.9 INFLAMMATION, SKIN: Primary | ICD-10-CM

## 2021-09-15 ASSESSMENT — MIFFLIN-ST. JEOR: SCORE: 1462.7

## 2021-09-15 NOTE — PROGRESS NOTES
"SUBJECTIVE:   Katty Pavon is a 41 year old female who presents to clinic today to discuss the following problem(s).    \"Bruising\" at  RIGHT elbow  - history of easy bruising  - thinks she was resting her arm on the back of an armrest and could have caused some injury at that time: it was sore, but there were no visible skin changes  - a few days later she noticed developing redness below the initial area of tenderness  - the area where there was initially some soreness now has petechiae present  - the area of redness at the area is not overly tender, she is managing with advil and ice on the area  - distal circulation and sensation is intact  - patient is worried: could this be cellulitis? Compartment syndrome?       ROS: 10 point ROS neg other than the symptoms noted above in the HPI.    Today's PHQ-2:  PHQ-2 ( 1999 Pfizer) 9/15/2021 4/27/2018   Q1: Little interest or pleasure in doing things 0 0   Q2: Feeling down, depressed or hopeless 0 0   PHQ-2 Score 0 0       Past Medical History:   Diagnosis Date     Anxiety      Depression      OCD (obsessive compulsive disorder)      Past Surgical History:   Procedure Laterality Date     ESOPHAGOSCOPY, GASTROSCOPY, DUODENOSCOPY (EGD), COMBINED  12/13/2013    Procedure: COMBINED ESOPHAGOSCOPY, GASTROSCOPY, DUODENOSCOPY (EGD);;  Surgeon: Qasim Kennedy MD;  Location:  GI     EXTRACTION(S) DENTAL       Family History   Problem Relation Age of Onset     Skin Cancer Paternal Grandmother         Not Melanoma     Thyroid Cancer Paternal Grandmother      Alzheimer Disease Paternal Grandmother      Bladder Cancer Paternal Grandfather         Advanced     Depression Paternal Grandfather      Hyperlipidemia Father      Anxiety Disorder Father      Obesity Father      Unknown/Adopted Mother      Social History     Tobacco Use     Smoking status: Current Every Day Smoker     Packs/day: 1.00     Types: Cigarettes     Smokeless tobacco: Never Used     Tobacco comment: " "very very rarely   Substance Use Topics     Alcohol use: Yes     Alcohol/week: 4.2 standard drinks     Types: 5 Standard drinks or equivalent per week     Comment: rare     Drug use: No     Social History     Social History Narrative    Lives with  and his son aged 18. No pets.  Life is going OK but very stressful past 10 years with brutal custody espinoza over husbands 5 children who have struggled with substance abuse.        Has a good support system.    Feels safe in all environments.    Wears seatbelt 100% of the time    Does not bike. Would wear a helmet if she biked.    Denies history of abuse, past or present, physical, sexual or emotional.    Basilia Chavira PA-C    12/19/19           Current Outpatient Medications   Medication     fluvoxaMINE (LUVOX) 100 MG tablet     VITAMIN D PO     No current facility-administered medications for this visit.     I have reviewed the patient's past medical, surgical, family, and social history.     OBJECTIVE:   /78 (BP Location: Left arm, Patient Position: Sitting, Cuff Size: Adult Regular)   Pulse 76   Temp 97.2  F (36.2  C) (Skin)   Resp 15   Ht 1.708 m (5' 7.25\")   Wt 77.1 kg (170 lb)   LMP 08/23/2021 (Within Days)   SpO2 96%   BMI 26.43 kg/m      Constitutional: well-appearing, appears stated age  Eyes: conjunctivae without erythema, sclera anicteric.   Cardiac: regular rate and rhythm, normal S1/S2, no murmur/rubs/gallops  Respiratory: lungs clear to auscultation bilaterally, normal work of breathing, no wheezes/crackles  Skin: RIGHT medial upper arm  - approximate 3-4cm area of petechiae at medial bicep, minimal pain, no erythema  - inferior to this area at area of medial and flexural elbow, approximate 8cm diameter area of mild swelling and redness without warmth to palpation and with minimal tenderness to palpation  - no visible lesion/possible nidus of infection  - no bleeding, no drainage, no papules or blisters  Psych: affect is full and " appropriate, speech is fluent and non-pressured    ASSESSMENT AND PLAN:     Katty was seen today for derm problem.    Diagnoses and all orders for this visit:    Inflammation, skin    Low suspicion for cellulitis or erysipelas based on exam and history. Given the history of injury just superior to the site and present paronychia, I have some suspicion for a superficial thrombophlebitis. Advised patient to continue monitoring at this time. Should symptoms become significantly worse I would consider ultrasound to assess for venous patency vs antibiotic treatment if this does start to declare itself as more of an infectious condition with fever, chills, nausea, etc.       Rashad Dorsey MD  [facility]  09/15/2021, 10:52 AM

## 2021-09-15 NOTE — NURSING NOTE
"41 year old  Chief Complaint   Patient presents with     Derm Problem     area pink, tender, possible bruising, x 1 week        Blood pressure 118/78, pulse 76, temperature 97.2  F (36.2  C), temperature source Skin, resp. rate 15, height 1.708 m (5' 7.25\"), weight 77.1 kg (170 lb), last menstrual period 08/23/2021, SpO2 96 %, not currently breastfeeding. Body mass index is 26.43 kg/m .  Patient Active Problem List   Diagnosis     OCD (obsessive compulsive disorder)     Eczema     Moderate episode of recurrent major depressive disorder (H)     Suicidal ideation       Wt Readings from Last 2 Encounters:   09/15/21 77.1 kg (170 lb)   09/09/21 78.1 kg (172 lb 4 oz)     BP Readings from Last 3 Encounters:   09/15/21 118/78   09/09/21 108/71   05/23/21 111/76         Current Outpatient Medications   Medication     fluvoxaMINE (LUVOX) 100 MG tablet     VITAMIN D PO     No current facility-administered medications for this visit.       Social History     Tobacco Use     Smoking status: Current Every Day Smoker     Packs/day: 1.00     Types: Cigarettes     Smokeless tobacco: Never Used     Tobacco comment: very very rarely   Substance Use Topics     Alcohol use: Yes     Alcohol/week: 4.2 standard drinks     Types: 5 Standard drinks or equivalent per week     Comment: rare     Drug use: No       Health Maintenance Due   Topic Date Due     ADVANCE CARE PLANNING  Never done     Pneumococcal Vaccine: Pediatrics (0 to 5 Years) and At-Risk Patients (6 to 64 Years) (1 of 2 - PPSV23) Never done     PREVENTIVE CARE VISIT  12/19/2020     INFLUENZA VACCINE (1) 09/01/2021     HPV TEST  07/19/2021     PAP  07/19/2021     PHQ-9  10/09/2021       Lab Results   Component Value Date    PAP NIL 07/19/2016         September 15, 2021 10:57 AM    "

## 2022-02-20 ENCOUNTER — HEALTH MAINTENANCE LETTER (OUTPATIENT)
Age: 42
End: 2022-02-20

## 2022-10-23 ENCOUNTER — HEALTH MAINTENANCE LETTER (OUTPATIENT)
Age: 42
End: 2022-10-23

## 2022-11-30 ASSESSMENT — PATIENT HEALTH QUESTIONNAIRE - PHQ9: SUM OF ALL RESPONSES TO PHQ QUESTIONS 1-9: 11

## 2022-12-05 ASSESSMENT — PATIENT HEALTH QUESTIONNAIRE - PHQ9
SUM OF ALL RESPONSES TO PHQ QUESTIONS 1-9: 11
SUM OF ALL RESPONSES TO PHQ QUESTIONS 1-9: 11
10. IF YOU CHECKED OFF ANY PROBLEMS, HOW DIFFICULT HAVE THESE PROBLEMS MADE IT FOR YOU TO DO YOUR WORK, TAKE CARE OF THINGS AT HOME, OR GET ALONG WITH OTHER PEOPLE: SOMEWHAT DIFFICULT

## 2022-12-05 ASSESSMENT — ENCOUNTER SYMPTOMS
ARTHRALGIAS: 0
DIARRHEA: 1
CHILLS: 0
PALPITATIONS: 0
HEMATOCHEZIA: 0
FREQUENCY: 0
SHORTNESS OF BREATH: 0
CONSTIPATION: 1
SORE THROAT: 0
EYE PAIN: 0
HEMATURIA: 0
JOINT SWELLING: 0
MYALGIAS: 0
PARESTHESIAS: 0
HEADACHES: 0
HEARTBURN: 1
ABDOMINAL PAIN: 1
DYSURIA: 0
WEAKNESS: 0
FEVER: 0
COUGH: 1
NAUSEA: 1
NERVOUS/ANXIOUS: 1
DIZZINESS: 0
BREAST MASS: 0

## 2022-12-07 NOTE — PROGRESS NOTES
"CC: Physical, Hemorrhoids (Discuss treatment options), Perimenopause (Symptoms and treatment), Fall (In September fell on left side, leg is still discolored from large bruise and also hurt tailbone. ), and Gastrointestinal Problem (Better but still having problems)      Katty is a 42 year old female who presents to clinic for an annual exam.       Chronic health conditions:  Patient Active Problem List   Diagnosis     OCD (obsessive compulsive disorder)     Moderate episode of recurrent major depressive disorder (H)   OCD, depression -- Sees psychiatry. On fluoxetine 80 mg daily. Washes hands a lot. Cleans a lot after using restroom. Think she might have hemorrhoids, irritated around the rectum.     Bruises easily, she has her whole life.     Has had low albumin levels. Wonders about her liver function.     We reviewed and updated her medication list as necessary. Her past medical and surgical history as well as her family history were reviewed and updated as necessary.    Gynecological history:  Menstrual/PMS/menopausal symptoms: regular monthly menses but \"I'm fairly certain I'm entering perimenopause.\" Having slightly heavier cycles the first 2 days, more intense cramps. Having night sweats right before her cycle and sometimes during her cycle. More irritability and fatigue right before her cycle.   Last Pap:  2016, result Normal & negative HPV  History of abnormal Paps: no  Concern for STIs: no  Safety: Feels safe in relationship  Contraceptive method: vasectomy  OB history: G0  Breast cancer screening: has not had mammogram, mother did have breast cancer    Other health-related habits:  Tobacco: Started in her 40s, cutting back - uses for anxiety, down to 8-10 cigs/day - will reach out if she wants more assistance     Alcohol: rare  Drug use: no   Vaccines: will update flu vaccine today   Hep C screenin2021 - Ab positive, RNA not detected  DM screenin2021 - non fasting glucose 103  Lipids: 2021 " "- , , HDL 41,   Colon cancer screening: due in spring when she turns 43 - hoping to discuss other non-colonoscopy and non-stool sample options d/t OCD symptoms       OBJECTIVE:   /73 (BP Location: Right arm, Patient Position: Sitting, Cuff Size: Adult Regular)   Pulse 79   Temp 97  F (36.1  C) (Skin)   Resp 12   Ht 1.696 m (5' 6.77\")   Wt 76.9 kg (169 lb 8 oz)   LMP 11/26/2022 (Exact Date)   SpO2 98%   BMI 26.73 kg/m     General: Healthy female in NAD.  Cooperative and pleasant.  HEENT: Normocephalic, atraumatic. Oropharynx moist without lesions or exudate.  TMs normal. Supple neck, without lymphadenopathy or thyromegaly.    Breasts: No obvious masses, axillary adenopathy or fibrocystic changes.    Lungs: CTA bilaterally.  CV: RRR, normal S1 and S2, without murmurs, rubs, or gallops appreciated.    Abdomen: Soft, NT, ND.  No masses or hepatosplenomegaly appreciated.    : Normal appearing external genitalia without lesion.  Vaginal mucosa pink and moist.  Cervix visualized and Pap performed.  Skin tag noted at the rectum.   Extremities: WWW, without deformity, edema.  Skin: Clear without lesions or rashes.   Neuro: Grossly intact, nonfocal.  Psych: Mood and affect appropriate.      ASSESSMENT AND PLAN:   Routine general medical examination at a health care facility  Pap collected today. Mammogram ordered. Lipid screening ordered.   Will check CBC d/t easy bruising but normal in the past.  Reviewed options for colon cancer screening at age 43.   No external hemorrhoids on exam today, but anal skin tag noted. Reviewed symptomatic care.      Moderate episode of recurrent major depressive disorder (H)  Mixed obsessional thoughts and acts  Continue follow-up with psychiatry and continue fluoxetine 80 mg daily.     History of vitamin D deficiency  History of low vitamin D. Takes supplement. Check vitamin D level today.     Low serum albumin  History of low protein and low albumin, will " recheck labs today and include UA and urine albumin to creatinine ratio.     Elevated TSH  History of elevated TSH x2 occurrences in 2018. Recheck level today, has been >1 yr.     Tobacco use  Ready to quit, but feels ready to cut back on her own. Will reach out if she would like assistance such as Quit Line number.     Return in 1 year (on 12/8/2023) for physical.    Rut Mcnair MD  Family Medicine

## 2022-12-08 ENCOUNTER — OFFICE VISIT (OUTPATIENT)
Dept: FAMILY MEDICINE | Facility: CLINIC | Age: 42
End: 2022-12-08
Payer: COMMERCIAL

## 2022-12-08 VITALS
BODY MASS INDEX: 26.6 KG/M2 | TEMPERATURE: 97 F | OXYGEN SATURATION: 98 % | HEIGHT: 67 IN | SYSTOLIC BLOOD PRESSURE: 109 MMHG | WEIGHT: 169.5 LBS | HEART RATE: 79 BPM | DIASTOLIC BLOOD PRESSURE: 73 MMHG | RESPIRATION RATE: 12 BRPM

## 2022-12-08 DIAGNOSIS — Z00.00 ROUTINE GENERAL MEDICAL EXAMINATION AT A HEALTH CARE FACILITY: Primary | ICD-10-CM

## 2022-12-08 DIAGNOSIS — Z13.220 SCREENING FOR LIPID DISORDERS: ICD-10-CM

## 2022-12-08 DIAGNOSIS — Z12.4 SCREENING FOR CERVICAL CANCER: ICD-10-CM

## 2022-12-08 DIAGNOSIS — R77.0 LOW SERUM ALBUMIN: ICD-10-CM

## 2022-12-08 DIAGNOSIS — F42.2 MIXED OBSESSIONAL THOUGHTS AND ACTS: ICD-10-CM

## 2022-12-08 DIAGNOSIS — F33.1 MODERATE EPISODE OF RECURRENT MAJOR DEPRESSIVE DISORDER (H): ICD-10-CM

## 2022-12-08 DIAGNOSIS — Z72.0 TOBACCO USE: ICD-10-CM

## 2022-12-08 DIAGNOSIS — Z86.39 HISTORY OF VITAMIN D DEFICIENCY: ICD-10-CM

## 2022-12-08 DIAGNOSIS — R79.89 ELEVATED TSH: ICD-10-CM

## 2022-12-08 DIAGNOSIS — Z12.31 ENCOUNTER FOR SCREENING MAMMOGRAM FOR MALIGNANT NEOPLASM OF BREAST: ICD-10-CM

## 2022-12-08 PROBLEM — R45.851 SUICIDAL IDEATION: Status: RESOLVED | Noted: 2021-05-22 | Resolved: 2022-12-08

## 2022-12-08 LAB
ERYTHROCYTE [DISTWIDTH] IN BLOOD BY AUTOMATED COUNT: 11.5 % (ref 10–15)
HCT VFR BLD AUTO: 46.9 % (ref 35–47)
HGB BLD-MCNC: 16.3 G/DL (ref 11.7–15.7)
MCH RBC QN AUTO: 31.7 PG (ref 26.5–33)
MCHC RBC AUTO-ENTMCNC: 34.8 G/DL (ref 31.5–36.5)
MCV RBC AUTO: 91 FL (ref 78–100)
PLATELET # BLD AUTO: 389 10E3/UL (ref 150–450)
RBC # BLD AUTO: 5.15 10E6/UL (ref 3.8–5.2)
WBC # BLD AUTO: 7 10E3/UL (ref 4–11)

## 2022-12-08 PROCEDURE — 87624 HPV HI-RISK TYP POOLED RSLT: CPT | Performed by: FAMILY MEDICINE

## 2022-12-08 PROCEDURE — 80053 COMPREHEN METABOLIC PANEL: CPT | Performed by: FAMILY MEDICINE

## 2022-12-08 PROCEDURE — 80061 LIPID PANEL: CPT | Performed by: FAMILY MEDICINE

## 2022-12-08 PROCEDURE — 82306 VITAMIN D 25 HYDROXY: CPT | Performed by: FAMILY MEDICINE

## 2022-12-08 PROCEDURE — G0145 SCR C/V CYTO,THINLAYER,RESCR: HCPCS | Performed by: FAMILY MEDICINE

## 2022-12-08 PROCEDURE — 84443 ASSAY THYROID STIM HORMONE: CPT | Performed by: FAMILY MEDICINE

## 2022-12-08 RX ORDER — FLUOXETINE 40 MG/1
2 CAPSULE ORAL DAILY
COMMUNITY
Start: 2022-12-03

## 2022-12-08 NOTE — PATIENT INSTRUCTIONS
CT colonography for colon cancer screening      Preventive Health Recommendations  Female Ages 40 to 49    Yearly exam:   See your health care provider every year in order to  Review health changes.   Discuss preventive care.    Review your medicines if your doctor prescribed any.    Get a Pap test every three years (unless you have an abnormal result and your provider advises testing more often).    If you get Pap tests with HPV test, you only need to test every 5 years, unless you have an abnormal result. You do not need a Pap test if your uterus was removed (hysterectomy) and you have not had cancer.    You should be tested each year for STDs (sexually transmitted diseases), if you're at risk.   Ask your doctor if you should have a mammogram.    Have a colonoscopy (test for colon cancer) if someone in your family has had colon cancer or polyps before age 50.     Have a cholesterol test every 5 years.     Have a diabetes test (fasting glucose) after age 45. If you are at risk for diabetes, you should have this test every 3 years.    Shots: Get a flu shot each year. Get a tetanus shot every 10 years.     Nutrition:   Eat at least 5 servings of fruits and vegetables each day.  Eat whole-grain bread, whole-wheat pasta and brown rice instead of white grains and rice.  Get adequate Calcium and Vitamin D.      Lifestyle  Exercise at least 150 minutes a week (an average of 30 minutes a day, 5 days a week). This will help you control your weight and prevent disease.  Limit alcohol to one drink per day.  No smoking.   Wear sunscreen to prevent skin cancer.  See your dentist every six months for an exam and cleaning.

## 2022-12-08 NOTE — NURSING NOTE
"    42 year old  Chief Complaint   Patient presents with     Physical     Hemorrhoids     Discuss treatment options     Perimenopause     Symptoms and treatment     Fall     In September fell on left side, leg is still discolored from large bruise and also hurt tailbone.      Gastrointestinal Problem     Better but still having problems       Blood pressure 109/73, pulse 79, temperature 97  F (36.1  C), temperature source Skin, resp. rate 12, height 1.696 m (5' 6.77\"), weight 76.9 kg (169 lb 8 oz), last menstrual period 11/26/2022, SpO2 98 %, not currently breastfeeding. Body mass index is 26.73 kg/m .  Patient Active Problem List   Diagnosis     OCD (obsessive compulsive disorder)     Eczema     Moderate episode of recurrent major depressive disorder (H)     Suicidal ideation       Wt Readings from Last 2 Encounters:   12/08/22 76.9 kg (169 lb 8 oz)   09/15/21 77.1 kg (170 lb)     BP Readings from Last 3 Encounters:   12/08/22 109/73   09/15/21 118/78   09/09/21 108/71         Current Outpatient Medications   Medication     FLUoxetine (PROZAC) 40 MG capsule     VITAMIN D PO     fluvoxaMINE (LUVOX) 100 MG tablet     No current facility-administered medications for this visit.       Social History     Tobacco Use     Smoking status: Every Day     Packs/day: 1.00     Types: Cigarettes     Smokeless tobacco: Never     Tobacco comments:     very very rarely   Vaping Use     Vaping Use: Never used   Substance Use Topics     Alcohol use: Yes     Alcohol/week: 4.2 standard drinks     Types: 5 Standard drinks or equivalent per week     Comment: rare     Drug use: No       Health Maintenance Due   Topic Date Due     ADVANCE CARE PLANNING  Never done     HEPATITIS B IMMUNIZATION (1 of 3 - 3-dose series) Never done     Pneumococcal Vaccine: Pediatrics (0 to 5 Years) and At-Risk Patients (6 to 64 Years) (1 - PCV) Never done     HPV TEST  07/19/2021     PAP  07/19/2021     COVID-19 Vaccine (4 - Booster for Pfizer series) " 01/31/2022     INFLUENZA VACCINE (1) 09/01/2022     PHQ-9  01/08/2023       Lab Results   Component Value Date    PAP NIL 07/19/2016 December 8, 2022 10:33 AM

## 2022-12-08 NOTE — NURSING NOTE
"Injectable Influenza Immunization Documentation    1.  Has the patient received the information for the injectable influenza vaccine? YES     2. Is the patient 6 months of age or older? YES     3. Does the patient have any of the following contraindications?         Severe allergy to eggs? No     Severe allergic reaction to previous influenza vaccines? No   Severe allergy to latex? No       History of Guillain-Westwood syndrome? No     Currently have a temperature greater than 100.4F? No        4.  Severely egg allergic patients should have flu vaccine eligibility assessed by an MD, RN, or pharmacist, and those who received flu vaccine should be observed for 15 min by an MD, RN, Pharmacist, Medical Technician, or member of clinic staff.\": YES    5. Latex-allergic patients should be given latex-free influenza vaccine Yes  . Please reference the Vaccine latex table to determine if your clinic s product is latex-containing.       Vaccination given by Marco Antonio Tom, EMT          "

## 2022-12-09 ENCOUNTER — TELEPHONE (OUTPATIENT)
Dept: LAB | Facility: CLINIC | Age: 42
End: 2022-12-09

## 2022-12-09 ENCOUNTER — MYC MEDICAL ADVICE (OUTPATIENT)
Dept: FAMILY MEDICINE | Facility: CLINIC | Age: 42
End: 2022-12-09

## 2022-12-09 DIAGNOSIS — F42.2 MIXED OBSESSIONAL THOUGHTS AND ACTS: Primary | ICD-10-CM

## 2022-12-09 DIAGNOSIS — R79.89 HIGH SERUM LOW-DENSITY LIPOPROTEIN (LDL): Primary | ICD-10-CM

## 2022-12-09 LAB
ALBUMIN SERPL BCG-MCNC: 4.3 G/DL (ref 3.5–5.2)
ALP SERPL-CCNC: 61 U/L (ref 35–104)
ALT SERPL W P-5'-P-CCNC: 16 U/L (ref 10–35)
ANION GAP SERPL CALCULATED.3IONS-SCNC: 12 MMOL/L (ref 7–15)
AST SERPL W P-5'-P-CCNC: 16 U/L (ref 10–35)
BILIRUB SERPL-MCNC: 0.4 MG/DL
BUN SERPL-MCNC: 16.3 MG/DL (ref 6–20)
CALCIUM SERPL-MCNC: 9.5 MG/DL (ref 8.6–10)
CHLORIDE SERPL-SCNC: 103 MMOL/L (ref 98–107)
CHOLEST SERPL-MCNC: 276 MG/DL
CREAT SERPL-MCNC: 0.89 MG/DL (ref 0.51–0.95)
DEPRECATED CALCIDIOL+CALCIFEROL SERPL-MC: 66 UG/L (ref 20–75)
DEPRECATED HCO3 PLAS-SCNC: 25 MMOL/L (ref 22–29)
GFR SERPL CREATININE-BSD FRML MDRD: 82 ML/MIN/1.73M2
GLUCOSE SERPL-MCNC: 90 MG/DL (ref 70–99)
HDLC SERPL-MCNC: 45 MG/DL
LDLC SERPL CALC-MCNC: 200 MG/DL
NONHDLC SERPL-MCNC: 231 MG/DL
POTASSIUM SERPL-SCNC: 4.8 MMOL/L (ref 3.4–5.3)
PROT SERPL-MCNC: 6.4 G/DL (ref 6.4–8.3)
SODIUM SERPL-SCNC: 140 MMOL/L (ref 136–145)
TRIGL SERPL-MCNC: 155 MG/DL
TSH SERPL DL<=0.005 MIU/L-ACNC: 3.6 UIU/ML (ref 0.3–4.2)

## 2022-12-09 NOTE — PROGRESS NOTES
Received called from [facility] that urine specimen was not enough for them to run labs. Orders were cancelled. I will reorder labs and notify patient that she needs to return for a lab visit to collect a new sample.   Priscilla Kidd, CMA

## 2022-12-09 NOTE — TELEPHONE ENCOUNTER
Received phone call from [facility], regarding patient urine sample being QNS. Orders were cancelled. I will contact patient to return to clinic for a lab only appointment to leave another urine sample. Tests have been reordered.   Priscilla Kidd, [facility]

## 2022-12-12 LAB
BKR LAB AP GYN ADEQUACY: NORMAL
BKR LAB AP GYN INTERPRETATION: NORMAL
BKR LAB AP HPV REFLEX: NORMAL
BKR LAB AP LMP: NORMAL
BKR LAB AP PREVIOUS ABNORMAL: NORMAL
PATH REPORT.COMMENTS IMP SPEC: NORMAL
PATH REPORT.COMMENTS IMP SPEC: NORMAL
PATH REPORT.RELEVANT HX SPEC: NORMAL

## 2022-12-14 LAB
HUMAN PAPILLOMA VIRUS 16 DNA: NEGATIVE
HUMAN PAPILLOMA VIRUS 18 DNA: NEGATIVE
HUMAN PAPILLOMA VIRUS FINAL DIAGNOSIS: NORMAL
HUMAN PAPILLOMA VIRUS OTHER HR: NEGATIVE

## 2023-02-06 ENCOUNTER — OFFICE VISIT (OUTPATIENT)
Dept: FAMILY MEDICINE | Facility: CLINIC | Age: 43
End: 2023-02-06
Payer: COMMERCIAL

## 2023-02-06 VITALS
DIASTOLIC BLOOD PRESSURE: 76 MMHG | SYSTOLIC BLOOD PRESSURE: 110 MMHG | RESPIRATION RATE: 17 BRPM | TEMPERATURE: 98 F | BODY MASS INDEX: 27.68 KG/M2 | OXYGEN SATURATION: 97 % | HEART RATE: 96 BPM | WEIGHT: 175.5 LBS

## 2023-02-06 DIAGNOSIS — N75.0 BARTHOLIN CYST: Primary | ICD-10-CM

## 2023-02-06 ASSESSMENT — ANXIETY QUESTIONNAIRES
5. BEING SO RESTLESS THAT IT IS HARD TO SIT STILL: NOT AT ALL
3. WORRYING TOO MUCH ABOUT DIFFERENT THINGS: SEVERAL DAYS
GAD7 TOTAL SCORE: 3
6. BECOMING EASILY ANNOYED OR IRRITABLE: SEVERAL DAYS
1. FEELING NERVOUS, ANXIOUS, OR ON EDGE: SEVERAL DAYS
2. NOT BEING ABLE TO STOP OR CONTROL WORRYING: NOT AT ALL
GAD7 TOTAL SCORE: 3
7. FEELING AFRAID AS IF SOMETHING AWFUL MIGHT HAPPEN: NOT AT ALL
IF YOU CHECKED OFF ANY PROBLEMS ON THIS QUESTIONNAIRE, HOW DIFFICULT HAVE THESE PROBLEMS MADE IT FOR YOU TO DO YOUR WORK, TAKE CARE OF THINGS AT HOME, OR GET ALONG WITH OTHER PEOPLE: SOMEWHAT DIFFICULT

## 2023-02-06 ASSESSMENT — PATIENT HEALTH QUESTIONNAIRE - PHQ9
SUM OF ALL RESPONSES TO PHQ QUESTIONS 1-9: 8
5. POOR APPETITE OR OVEREATING: NOT AT ALL

## 2023-02-06 NOTE — NURSING NOTE
42 year old  Chief Complaint   Patient presents with     Pain     Potential hernia? Noticed a buldge when wiping. Tender and aching. No discoloration that she can see.        Blood pressure 110/76, pulse 96, temperature 98  F (36.7  C), temperature source Temporal, resp. rate 17, weight 79.6 kg (175 lb 8 oz), last menstrual period 01/24/2023, SpO2 97 %, not currently breastfeeding. Body mass index is 27.68 kg/m .  Patient Active Problem List   Diagnosis     OCD (obsessive compulsive disorder)     Moderate episode of recurrent major depressive disorder (H)       Wt Readings from Last 2 Encounters:   02/06/23 79.6 kg (175 lb 8 oz)   12/08/22 76.9 kg (169 lb 8 oz)     BP Readings from Last 3 Encounters:   02/06/23 110/76   12/08/22 109/73   09/15/21 118/78         Current Outpatient Medications   Medication     FLUoxetine (PROZAC) 40 MG capsule     VITAMIN D PO     No current facility-administered medications for this visit.       Social History     Tobacco Use     Smoking status: Every Day     Packs/day: 0.50     Types: Cigarettes     Smokeless tobacco: Never     Tobacco comments:     very very rarely   Vaping Use     Vaping Use: Never used   Substance Use Topics     Alcohol use: Yes     Alcohol/week: 4.2 standard drinks     Types: 5 Standard drinks or equivalent per week     Comment: rare     Drug use: No       Health Maintenance Due   Topic Date Due     NICOTINE/TOBACCO CESSATION COUNSELING Q 1 YR  Never done     HEPATITIS B IMMUNIZATION (1 of 3 - 3-dose series) Never done     Pneumococcal Vaccine: Pediatrics (0 to 5 Years) and At-Risk Patients (6 to 64 Years) (1 - PCV) Never done     COVID-19 Vaccine (4 - Booster for Pfizer series) 01/31/2022     PHQ-9  01/08/2023       Lab Results   Component Value Date    PAP NIL 07/19/2016 February 6, 2023 10:50 AM

## 2023-02-06 NOTE — PROGRESS NOTES
Assessment & Plan   Problem List Items Addressed This Visit    None  Visit Diagnoses     Bartholin cyst    -  Primary         Chief suspicion for cyst as noted above. Encouraged soaking in epsom salts 1-2 times daily. Also encouraged follow up with OBGYN if symptoms worsen or fail to improve as they may consider steroid injection vs I&D. No need for ABX at this time.    27 minutes spent on the date of the encounter doing chart review, history and exam, documentation and further activities as noted.    Rashad Dorsey MD  [facility]    Mary Lou Alaniz is a 42 year old presenting for the following health issues:  Pain (Potential hernia? Noticed a buldge when wiping. Tender and aching. No discoloration that she can see. )      HPI   Vulvar mass  - RIGHT, posterior vulva  - felt it yesterday for the first time  - moderately tender to palpation  - concerned it might be a hernia, but doesn't really know what it is  - no vaginal drainage of bleeding  - no smell  - no dysuria  - no concerns for STDs  - no history of trauma to the area    Review of Systems   Constitutional, HEENT, cardiovascular, pulmonary, gi and gu systems are negative, except as otherwise noted.      Objective    /76 (BP Location: Left arm, Patient Position: Sitting, Cuff Size: Adult Regular)   Pulse 96   Temp 98  F (36.7  C) (Temporal)   Resp 17   Wt 79.6 kg (175 lb 8 oz)   LMP 01/24/2023 (Approximate)   SpO2 97%   BMI 27.68 kg/m    Body mass index is 27.68 kg/m .  Physical Exam   GENERAL: healthy, alert and no distress  NECK: no adenopathy, no asymmetry, masses, or scars and thyroid normal to palpation  RESP: lungs clear to auscultation - no rales, rhonchi or wheezes  CV: regular rate and rhythm, normal S1 S2, no S3 or S4, no murmur, click or rub, no peripheral edema and peripheral pulses strong  ABDOMEN: soft, nontender, no hepatosplenomegaly, no masses and bowel sounds normal  : palpable, mobile, rubbery lump at  posterolateral vulva, no drainage or bleeding. Tender to palpation  MS: no gross musculoskeletal defects noted, no edema

## 2023-02-08 ENCOUNTER — TELEPHONE (OUTPATIENT)
Dept: OBGYN | Facility: CLINIC | Age: 43
End: 2023-02-08
Payer: COMMERCIAL

## 2023-02-08 NOTE — TELEPHONE ENCOUNTER
Katty used to be a patient here, but most recently has been seen at [place].  She states that she was seen this week for a painful lump on the vulva that her PCP believes to be a bartholin cyst.  She is applying heat per his suggestion, and is hopeful that it will resolve on its own.     She would like an appointment here for possible drainage.      Scheduled.

## 2023-06-01 ENCOUNTER — HEALTH MAINTENANCE LETTER (OUTPATIENT)
Age: 43
End: 2023-06-01

## 2023-06-28 ENCOUNTER — MYC MEDICAL ADVICE (OUTPATIENT)
Dept: FAMILY MEDICINE | Facility: CLINIC | Age: 43
End: 2023-06-28

## 2023-06-28 ENCOUNTER — PATIENT OUTREACH (OUTPATIENT)
Dept: ONCOLOGY | Facility: CLINIC | Age: 43
End: 2023-06-28
Payer: COMMERCIAL

## 2023-06-28 DIAGNOSIS — Z80.8 FAMILY HISTORY OF BRAIN CANCER: ICD-10-CM

## 2023-06-28 DIAGNOSIS — Z80.3 FAMILY HISTORY OF MALIGNANT NEOPLASM OF BREAST: Primary | ICD-10-CM

## 2023-06-28 NOTE — PROGRESS NOTES
received Cancer Risk Management Program referral, referred for:     both parents have cancer; advised to have genetic testing     Reviewed for appropriate plan, and sent to New Patient Scheduling for completion.

## 2023-10-24 ENCOUNTER — VIRTUAL VISIT (OUTPATIENT)
Dept: ONCOLOGY | Facility: CLINIC | Age: 43
End: 2023-10-24
Attending: FAMILY MEDICINE
Payer: COMMERCIAL

## 2023-10-24 DIAGNOSIS — Z80.3 FAMILY HISTORY OF MALIGNANT NEOPLASM OF BREAST: ICD-10-CM

## 2023-10-24 DIAGNOSIS — Z80.7 FAMILY HISTORY OF LYMPHOMA: Primary | ICD-10-CM

## 2023-10-24 DIAGNOSIS — Z80.8 FAMILY HISTORY OF BRAIN CANCER: ICD-10-CM

## 2023-10-24 PROCEDURE — 96040 HC GENETIC COUNSELING, EACH 30 MINUTES: CPT | Mod: GT,95 | Performed by: GENETIC COUNSELOR, MS

## 2023-10-24 NOTE — LETTER
10/24/2023         RE: Katty Pavon  [address] [place] [address]        Dear Colleague,    Thank you for referring your patient, Katty Pavon, to the [facility]. Please see a copy of my visit note below.    Virtual Visit Details    Type of service:  Video Visit   Joined the call at 10/24/2023, 11:10:13 am.  Left the call at 10/24/2023, 11:41:49 am.  Originating Location (pt. Location): Home  Distant Location (provider location):  Off-site  Platform used for Video Visit: [facility]    10/24/2023    Referring Provider: Rut Mcnair MD    Presenting Information:   I met with Katty Pavon today for genetic counseling at the Cancer Risk Management Program (virtual visit) to discuss her family history of cancer.  She is here today to review this history, cancer screening recommendations, and available genetic testing options.    Personal History:  Katty is a 43 year old female. She does not have any personal history of cancer.  She had her first period at age 11, and is pre/perimenopause.  She does not have biological children.      Family History: (Please see scanned pedigree for detailed family history information)  Katty's mother was diagnosed with lobular breast cancer when 68.  Family history information not available as she was adopted.   Katty's father was diagnosed with lymphoma when 71, and passed away at age 72  Her paternal grandmother also had a history of head/neck lymphoma, and passed away when 78  Her paternal family history is [employer]azi [employer].      Discussion:  Katty's mother was diagnosed with breast cancer, and additional maternal health history information is unavailable.  Her paternal family history is significant for two generations of lymphoma, and Ash[employer]azi [employer] ancestry.      We reviewed the features of sporadic, familial, and hereditary cancers. A detailed handout regarding hereditary cancer and the information we discussed was  provided to Katty at the end of our appointment today and can be found in the after visit summary. Topics included: inheritance pattern, cancer risks, cancer screening recommendations, and also risks, benefits and limitations of testing.  Katty's risk for breast cancer may be slightly elected due to her family history.  Based on her personal and family history, Katty's estimated risk for developing breast cancer may be as high as 22.6% (IBISv8 model).  Breast density may influence this risk (if scattered fibroglandular, the risk may be reduced to 16%).    Katty may be at slightly increased risk (up to 3.6% for NHL) for developing lymphoma due to her family history.  We discussed the limitations of germline genetic testing, however testing is available for a panel of genes.  Given the clinical overlap of genes associated with lymphoma susceptibility, broad panel testing allows for an efficient evaluation of several potential genes based on a single clinical indication.  We discussed that there are additional genes that could cause increased risk for cancer. As many of these genes present with overlapping features in a family and accurate cancer risk cannot always be established based upon the pedigree analysis alone, it would be reasonable for Katty to consider panel genetic testing to analyze multiple genes at once.  Health elected to proceed with an expanded hereditary cancers panel.  Verbal consent was obtained for the Multi Cancers and Lymphoma panels through [employer] [facility].  Test turn around time: 2-4 weeks.  Katty elected to proceed with the $250 patient pay option (to be coordinated through [employer]).    [facility] [facility] Multi Cancer panel: AIP, ALK, APC, MIGUEL, AXIN2, BAP1, BARD1, BLM, BMPR1A, BRCA1, BRCA2, BRIP1, CASR, CDC73, CDH1, CDK4, CDKN1B, CDKN1C, CDKN2A, CEBPA, CHEK2, CTNNA1, DICER1, DIS3L2, EGFR, EPCAM, FH, FLCN, GATA2, GPC3, GREM1, HOXB13, HRAS, KIT, MAX, MEN1, MET, MITF, MLH1, MSH2, MSH3, MSH6, MUTYH,  NBN, NF1, NF2, NTHL1, PALB2, PDGFRA, PHOX2B, PMS2, POLD1, POLE, POT1, HGAEG5W, PTCH1, PTEN, RAD50, RAD51C, RAD51D, RB1, RECQL4, RET, RUNX1, SDHA, SDHAF2, SDHB, SDHC, SDHD, SMAD4, SMARCA4, SMARCB1, SMARCE1, STK11, SUFU, TERC, TERT, LFJF134, TP53, TSC1, TSC2, VHL, WRN, WT1  [facility] Hereditary Lymphoma panel: ADA, MIGUEL, BLM, CARD11, CARMIL2, CASP8, CD27, CTLA4, CTPS1, DOCK8, EPCAM, FADD, FAS, FASLG, FCHO1, IKZF1, IL2RA, IL2RB, ITK, MAGT1, MCM4, MLH1, MSH2, MSH6, NBN, NF1, PIK3CD, PIK3R1, PMS2, PRKCD, RAC2, RASGRP1, RHOH, RMRP, SH2D1A, STAT3, STK4, STXBP2, NWAMCE93U, TP53, TPP2, WAS, XIAP  Medical Management: For Katty, we reviewed that the information from genetic testing may determine:  additional cancer screening for which Katty may qualify (i.e. mammogram and breast MRI, more frequent colonoscopies, more frequent dermatologic exams, etc.),  options for risk reducing surgeries Katty could consider (i.e. bilateral mastectomy, surgery to remove her ovaries and/or uterus, etc.),    and targeted chemotherapies for certain cancers in the future (i.e. immunotherapy for individuals with Denis syndrome, PARP inhibitors, etc.).   These recommendations and possible targeted chemotherapies will be discussed in detail once genetic testing is completed.     Plan:  1) Today Katty elected to proceed with a comprehensive cancer panel, including the [employer] Multi Cancer and Lymphoma gene panels.    2) This information should be available in 4 weeks.  A saliva kit will be provided by mail  3) Katty will return to clinic to discuss the results.    Chanelle Bowers MS, [facility]  Licensed, Certified Genetic Counselor  [facility]  Phone: [phone]

## 2023-10-24 NOTE — NURSING NOTE
Is the patient currently in the state of [place]? YES    Visit mode:VIDEO    If the visit is dropped, the patient can be reconnected by: VIDEO VISIT: Send to e-mail at: [email or web address]    Will anyone else be joining the visit? NO  (If patient encounters technical issues they should call [phone])    How would you like to obtain your AVS? MyChart    Are changes needed to the allergy or medication list? N/A    Reason for visit: Consult    Becky SPRINGER

## 2023-10-24 NOTE — PROGRESS NOTES
Virtual Visit Details    Type of service:  Video Visit   Joined the call at 10/24/2023, 11:10:13 am.  Left the call at 10/24/2023, 11:41:49 am.  Originating Location (pt. Location): Home  Distant Location (provider location):  Off-site  Platform used for Video Visit: [employer]    10/24/2023    Referring Provider: Rut Mcnair MD    Presenting Information:   I met with Katty Pavon today for genetic counseling at the Cancer Risk Management Program (virtual visit) to discuss her family history of cancer.  She is here today to review this history, cancer screening recommendations, and available genetic testing options.    Personal History:  Katty is a 43 year old female. She does not have any personal history of cancer.  She had her first period at age 11, and is pre/perimenopause.  She does not have biological children.      Family History: (Please see scanned pedigree for detailed family history information)  Katty's mother was diagnosed with lobular breast cancer when 68.  Family history information not available as she was adopted.   Katty's father was diagnosed with lymphoma when 71, and passed away at age 72  Her paternal grandmother also had a history of head/neck lymphoma, and passed away when 78  Her paternal family history is Ashkenazi [employer].      Discussion:  Katty's mother was diagnosed with breast cancer, and additional maternal health history information is unavailable.  Her paternal family history is significant for two generations of lymphoma, and Ashkenazi [employer]ism ancestry.      We reviewed the features of sporadic, familial, and hereditary cancers. A detailed handout regarding hereditary cancer and the information we discussed was provided to Katty at the end of our appointment today and can be found in the after visit summary. Topics included: inheritance pattern, cancer risks, cancer screening recommendations, and also risks, benefits and limitations of testing.  Antonios risk for breast  cancer may be slightly elected due to her family history.  Based on her personal and family history, Katty's estimated risk for developing breast cancer may be as high as 22.6% (IBISv8 model).  Breast density may influence this risk (if scattered fibroglandular, the risk may be reduced to 16%).    Katty may be at slightly increased risk (up to 3.6% for NHL) for developing lymphoma due to her family history.  We discussed the limitations of germline genetic testing, however testing is available for a panel of genes.  Given the clinical overlap of genes associated with lymphoma susceptibility, broad panel testing allows for an efficient evaluation of several potential genes based on a single clinical indication.  We discussed that there are additional genes that could cause increased risk for cancer. As many of these genes present with overlapping features in a family and accurate cancer risk cannot always be established based upon the pedigree analysis alone, it would be reasonable for Katty to consider panel genetic testing to analyze multiple genes at once.  Health elected to proceed with an expanded hereditary cancers panel.  Verbal consent was obtained for the Multi Cancers and Lymphoma panels through [employer].  Test turn around time: 2-4 weeks.  Katty elected to proceed with the $250 patient pay option (to be coordinated through [employer]).    [facility] Multi Cancer panel: AIP, ALK, APC, MIGUEL, AXIN2, BAP1, BARD1, BLM, BMPR1A, BRCA1, BRCA2, BRIP1, CASR, CDC73, CDH1, CDK4, CDKN1B, CDKN1C, CDKN2A, CEBPA, CHEK2, CTNNA1, DICER1, DIS3L2, EGFR, EPCAM, FH, FLCN, GATA2, GPC3, GREM1, HOXB13, HRAS, KIT, MAX, MEN1, MET, MITF, MLH1, MSH2, MSH3, MSH6, MUTYH, NBN, NF1, NF2, NTHL1, PALB2, PDGFRA, PHOX2B, PMS2, POLD1, POLE, POT1, EVLDN0M, PTCH1, PTEN, RAD50, RAD51C, RAD51D, RB1, RECQL4, RET, RUNX1, SDHA, SDHAF2, SDHB, SDHC, SDHD, SMAD4, SMARCA4, SMARCB1, SMARCE1, STK11, SUFU, TERC, TERT, CQYY705, TP53, TSC1, TSC2, VHL, WRN,  WT1  [facility] Hereditary Lymphoma panel: ADA, MIGUEL, BLM, CARD11, CARMIL2, CASP8, CD27, CTLA4, CTPS1, DOCK8, EPCAM, FADD, FAS, FASLG, FCHO1, IKZF1, IL2RA, IL2RB, ITK, MAGT1, MCM4, MLH1, MSH2, MSH6, NBN, NF1, PIK3CD, PIK3R1, PMS2, PRKCD, RAC2, RASGRP1, RHOH, RMRP, SH2D1A, STAT3, STK4, STXBP2, AOLBPF23E, TP53, TPP2, WAS, XIAP  Medical Management: For Katty, we reviewed that the information from genetic testing may determine:  additional cancer screening for which Katty may qualify (i.e. mammogram and breast MRI, more frequent colonoscopies, more frequent dermatologic exams, etc.),  options for risk reducing surgeries Katty could consider (i.e. bilateral mastectomy, surgery to remove her ovaries and/or uterus, etc.),    and targeted chemotherapies for certain cancers in the future (i.e. immunotherapy for individuals with Denis syndrome, PARP inhibitors, etc.).   These recommendations and possible targeted chemotherapies will be discussed in detail once genetic testing is completed.     Plan:  1) Today Katty elected to proceed with a comprehensive cancer panel, including the [facility] Multi Cancer and Lymphoma gene panels.    2) This information should be available in 4 weeks.  A saliva kit will be provided by mail  3) Katty will return to clinic to discuss the results.    Chanelle Bowers MS, [facility]  Licensed, Certified Genetic Counselor  [facility]  Phone: [phone]

## 2023-10-26 NOTE — PATIENT INSTRUCTIONS
Assessing Cancer Risk  Cancer is a common diagnosis which impacts many families.  Individuals may develop cancer due to environmental factors (such as exposures and lifestyle), aging, genetic predisposition, or a combination of these factors.  The vast majority of cancer diagnoses are considered sporadic, and not primarily due to an inherited factor. Approximately 5-10% of cancer diagnoses are thought to be caused by inherited risk factors.       Many of the genes we are born with impact our risk of certain diseases, such as cancer.  When one of these genes is not working properly due to a mistake (known as a  mutation  or  variant ), this may lead to an increased risk of developing cancer.      Families impacted by a hereditary cancer syndrome are more likely to have relatives across several generations diagnosed with cancer, earlier ages of diagnoses (prior to age 50), certain rare tumors, or relatives diagnosed with multiple primary cancers.  However, this may not be the case for all families which carry a cancer risk gene, such as those with small family size or limited history information.         Genetic Testing  For some families, genetic testing may help to explain why their cancer developed, provide tailored management options, and clarify the risk of developing cancer in the future.      Genetic testing involves a simple blood or saliva test and will look at the genetic information in select genes for variants associated with cancer risk.  This testing may include analysis of a single gene due to a known variant in the family, multiple genes most associated with the cancers in a family, or an expanded panel of genes related to many types of cancers.    Results  There are several possible genetic test results, including:   Positive--a harmful mutation (also known as a  pathogenic  or  likely pathogenic  variant) was identified in a gene associated with increased cancer risk.  These risks, as well as  medical management options, depend on the specific genetic variant identified.    Negative--no variants were identified in the genes analyzed   Variant of unknown significance--a variant was identified in one or more genes, though it is currently unclear how this impacts cancer risk in the family.  Genetic testing labs are working to collect evidence about these uncertain variants and may provide updates in the future.    Medical Management  If a harmful mutation is found in a cancer risk gene, there may be increased cancer surveillance or preventative surgeries that can be offered. This information will be discussed after genetic testing is completed. If no mutations are found on genetic testing, screening is often recommended based on personal and/or family history of cancer. All cancer risk management options should be discussed in more detail with an individual's medical providers.    Inheritance   Variants in most cancer risk genes are inherited in an autosomal dominant pattern.  This means that if a parent has a variant, each of their children will have a 50% chance of inheriting that same variant.  Therefore, each child would have a 50% chance of being at increased risk for developing cancer.    Some cancer risk genes are inherited in an autosomal recessive pattern.  These risks are present when an individual inherits mutations from both parents in the same gene.         Genetic Information Nondiscrimination Act (RONNIE)  The Genetic Information Nondiscrimination Act of 2008 (RONNIE) is a federal law that protects individuals from health insurance or employment discrimination based on a genetic test result alone (with some exceptions, including employers with fewer than 15 employees, and ).  However, RONNIE's protection does not cover life insurance, long term care, or disability insurances.  The [facility] [employer] [place] is a great resource to learn more.    Questions to Think About  Regarding Genetic Testing  What effect will the test result have on me and my relationship with my family members if I have an inherited gene mutation?  If I don't have a gene mutation?  Should I share my test results, and how will my family react to this news, which may also affect them?  Are my children ready to learn new information that may one day affect their own health?    Please call us if you have any questions or concerns   (Appointments: [phone])    Quinn Pearson, MS [facility]  [phone]  Mary Santos, MS, [facility] [phone]  Anisha Ellington, MS, [facility]  [phone]  Chanelle Bowers, MS, [facility]  [phone]  María Marie, MS, [facility]  [phone]  Padmaja Wells, MS, [facility]  [phone]  Sandra Rod, MS, [facility]  [phone]

## 2024-01-14 ENCOUNTER — HEALTH MAINTENANCE LETTER (OUTPATIENT)
Age: 44
End: 2024-01-14

## 2024-05-20 ENCOUNTER — ANCILLARY PROCEDURE (OUTPATIENT)
Dept: MAMMOGRAPHY | Facility: CLINIC | Age: 44
End: 2024-05-20
Attending: FAMILY MEDICINE
Payer: COMMERCIAL

## 2024-05-20 DIAGNOSIS — Z12.31 VISIT FOR SCREENING MAMMOGRAM: ICD-10-CM

## 2024-05-20 PROCEDURE — 77063 BREAST TOMOSYNTHESIS BI: CPT | Mod: TC | Performed by: RADIOLOGY

## 2024-05-20 PROCEDURE — 77067 SCR MAMMO BI INCL CAD: CPT | Mod: TC | Performed by: RADIOLOGY

## 2024-12-11 NOTE — PROGRESS NOTES
[facility] Dermatology Note  Encounter Date: Dec 12, 2024  Office Visit     Dermatology Problem List:  FBSE 12/12/24  #inflamed acrochordons s/p LN2 12/12/24  #iSK, s/p LN2, neck  #Rosacea, bilateral cheeks   - current: azelaic acid 15% daily   ____________________________________________    Assessment & Plan:   #Benign skin exam including multiple benign nevi, solar lentigines, seborrheic keratoses, cherry angiomas. Explained to patient benign nature of lesion. No treatment is necessary at this time unless the lesion changes or becomes symptomatic.   - ABCDs of melanoma were discussed and self skin checks were advised.  - Sun precaution was advised including the use of sun screens of SPF 30 or higher, sun protective clothing, and avoidance of sun.    #Inflamed acrochordons, neck   - Reviewed benign nature, however given irritation and inflammation, patient elects for LN2 today   - Cryotherapy performed today (see procedure note(s) below).    # Rosacea, telangiectatic on exam but with patient reported history of papulopustular symptoms.   - Reviewed diagnosis and treatment options including topical metronidazole, azelaic acid, and PDL  - Given h/o allergic reaction to oral metronidazole patient elects for azelaic acid 15% daily   - Continue to avoid flares as tolerated     Procedures Performed:   - Cryotherapy procedure note, location(s): anterior and posterior neck. After verbal consent and discussion of risks and benefits including, but not limited to, dyspigmentation/scar, blister, and pain, 15+ lesion(s) was(were) treated with 1-2 mm freeze border for 1-2 cycles with liquid nitrogen. Post cryotherapy instructions were provided.    Follow-up: 1 year(s) in-person, or earlier for new or changing lesions    Staff and Resident: [unfilled] Patient seen and staffed with Dr. Antoine Briggs MD  Dermatology Resident PGY-2    ____________________________________________    CC: Skin Check  (Katty is here for a skin check and has spots of concern on her neck she would like removed as they are getting caught on clothing and bleeding as well as causing discomfort.)    HPI:  Ms. Katty Pavon is a(n) 44 year old female who presents today as a new patient for FBSE.     Patient reports a few areas of concern around her neck.  She reports that these are skin tags, however notes that they are frequently irritated, tender, and get caught along her clothing, especially during the winter months when she is wearing turtle necks.  She reports that they have been frozen off in the past by Dr. Walker.  Otherwise, patient denies any other areas of concern or symptomatic lesions.    She reports no personal history of skin cancer.  Endorses a family history of basal cell carcinoma in her paternal grandparents.  Denies any family history of melanoma.  Reports staying out of the sun regularly, but does not wear sunscreen regularly.    Patient is otherwise feeling well, without additional skin concerns.    Labs Reviewed:  N/A    Physical Exam:  Vitals: There were no vitals taken for this visit.  SKIN: Full skin, which includes the head/face, both arms, chest, back, abdomen,both legs, genitalia and/or groin buttocks, digits and/or nails, was examined.  - There are dome shaped bright red papules on the trunk and extremities.   - Multiple regular brown pigmented macules and papules are identified on the trunk and extremities.   - There is(are) skin colored pedunculated papules with erythema on the anterior and posterior neck.   - Scattered brown macules on sun exposed areas.  - There are waxy stuck on tan to brown papules on the trunk and extremities.  - Scattered telangiectasias over the bilateral cheeks.   - No other lesions of concern on areas examined.     Medications:  Current Outpatient Medications   Medication Sig Dispense Refill    azelaic acid (FINACIA) 15 % external gel Apply topically daily. 50 g 3     FLUoxetine (PROZAC) 40 MG capsule Take 2 capsules by mouth daily      VITAMIN D PO        No current facility-administered medications for this visit.      Past Medical History:   Patient Active Problem List   Diagnosis    OCD (obsessive compulsive disorder)    Moderate episode of recurrent major depressive disorder (H)     Past Medical History:   Diagnosis Date    Anxiety     Depression     Eczema     OCD (obsessive compulsive disorder)     Suicidal ideation 5/22/2021    Never had a plan and never had a suicide attempt.        CC Referred Self, MD  No address on file on close of this encounter.

## 2024-12-12 ENCOUNTER — OFFICE VISIT (OUTPATIENT)
Dept: DERMATOLOGY | Facility: CLINIC | Age: 44
End: 2024-12-12
Payer: COMMERCIAL

## 2024-12-12 DIAGNOSIS — L91.8 INFLAMED ACROCHORDON: ICD-10-CM

## 2024-12-12 DIAGNOSIS — L71.9 ROSACEA: Primary | ICD-10-CM

## 2024-12-12 DIAGNOSIS — Z12.83 SKIN CANCER SCREENING: ICD-10-CM

## 2024-12-12 RX ORDER — AZELAIC ACID 0.15 G/G
GEL TOPICAL DAILY
Qty: 50 G | Refills: 3 | Status: SHIPPED | OUTPATIENT
Start: 2024-12-12

## 2024-12-12 ASSESSMENT — PAIN SCALES - GENERAL: PAINLEVEL_OUTOF10: NO PAIN (0)

## 2024-12-12 NOTE — PATIENT INSTRUCTIONS
Cryotherapy    What is it?  Use of a very cold liquid, such as liquid nitrogen, to freeze and destroy abnormal skin cells that need to be removed    What should I expect?  Tenderness and redness  A small blister that might grow and fill with dark purple blood. There may be crusting.  More than one treatment may be needed if the lesions do not go away.    How do I care for the treated area?  Gently wash the area with your hands when bathing.  Use a thin layer of Vaseline to help with healing. You may use a Band-Aid.   The area should heal within 7-10 days and may leave behind a pink or lighter color.   Do not use an antibiotic or Neosporin ointment.   You may take acetaminophen (Tylenol) for pain.     Call your doctor if you have:  Severe pain  Signs of infection (warmth, redness, cloudy yellow drainage, and or a bad smell)  Questions or concerns    Who should I call with questions?      [facility]: [phone]      [facility]: [phone]      For urgent needs outside of business hours call the [facility] at [phone] and ask for the dermatology resident on call      START Azelaic acid 15% gel     Patient Education   Skin Cancer Prevention: Care Instructions  Your Care Instructions     Skin cancer is the abnormal growth of cells in the skin. It usually appears as a growth that changes in color, shape, or size. This can be a sore that does not heal or a change in a mole. Skin cancer is almost always curable when found early and treated. So it is important to see your doctor if you have any of these changes in your skin.  Skin cancer is the most common type of cancer. It often appears on areas of the body that have been exposed to the sun, such as the head, face, neck, back, chest, or shoulders.  Follow-up care is a key part of your treatment and safety. Be sure to make and go to all appointments, and call your doctor if you are having  "problems. It's also a good idea to know your test results and keep a list of the medicines you take.  How can you care for yourself at home?  Wear a wide-brimmed hat and long sleeves and pants if you are going to be outdoors for a long time.  Avoid the sun between 10 a.m. and 4 p.m., which is the peak time for UV rays.  Wear sunscreen on exposed skin. Make sure to use a broad-spectrum sunscreen that has a sun protection factor (SPF) of 30 or higher. Use it every day, even when it is cloudy.  Do not use tanning booths or sunlamps.  Use lip balm or cream that has sun protection factor (SPF) to protect your lips from getting sunburned.  Wear sunglasses that block UV rays.  When should you call for help?  Watch closely for changes in your health, and be sure to contact your doctor if:    You see a change in your skin, such as a growth or mole that:  Grows bigger. This may happen very slowly.  Changes color.  Changes shape.  Starts to bleed easily.     You have swollen glands in your armpits, groin, or neck.     You do not get better as expected.   Where can you learn more?  Go to https://www.[employer].net/patiented  Enter P392 in the search box to learn more about \"Skin Cancer Prevention: Care Instructions.\"  Current as of: November 16, 2023  Content Version: 14.2 2024 [employer].   Care instructions adapted under license by your healthcare professional. If you have questions about a medical condition or this instruction, always ask your healthcare professional. [employer] disclaims any warranty or liability for your use of this information.       "

## 2024-12-12 NOTE — NURSING NOTE
Dermatology Rooming Note    Katty Pavon's goals for this visit include:   Chief Complaint   Patient presents with    Skin Check     Katty is here for a skin check and has spots of concern on her neck she would like removed as they are getting caught on clothing and bleeding as well as causing discomfort.     Slick Boyle, EMT

## 2024-12-12 NOTE — LETTER
12/12/2024       RE: Katty Pavon  [address] [place] [address]     Dear Colleague,    Thank you for referring your patient, Katty Pavon, to the [facility] [place] at [facility]. Please see a copy of my visit note below.    [facility] Dermatology Note  Encounter Date: Dec 12, 2024  Office Visit     Dermatology Problem List:  FBSE 12/12/24  #inflamed acrochordons s/p LN2 12/12/24  #iSK, s/p LN2, neck  #Rosacea, bilateral cheeks   - current: azelaic acid 15% daily   ____________________________________________    Assessment & Plan:   #Benign skin exam including multiple benign nevi, solar lentigines, seborrheic keratoses, cherry angiomas. Explained to patient benign nature of lesion. No treatment is necessary at this time unless the lesion changes or becomes symptomatic.   - ABCDs of melanoma were discussed and self skin checks were advised.  - Sun precaution was advised including the use of sun screens of SPF 30 or higher, sun protective clothing, and avoidance of sun.    #Inflamed acrochordons, neck   - Reviewed benign nature, however given irritation and inflammation, patient elects for LN2 today   - Cryotherapy performed today (see procedure note(s) below).    # Rosacea, telangiectatic on exam but with patient reported history of papulopustular symptoms.   - Reviewed diagnosis and treatment options including topical metronidazole, azelaic acid, and PDL  - Given h/o allergic reaction to oral metronidazole patient elects for azelaic acid 15% daily   - Continue to avoid flares as tolerated     Procedures Performed:   - Cryotherapy procedure note, location(s): anterior and posterior neck. After verbal consent and discussion of risks and benefits including, but not limited to, dyspigmentation/scar, blister, and pain, 15+ lesion(s) was(were) treated with 1-2 mm freeze border for 1-2 cycles with liquid  nitrogen. Post cryotherapy instructions were provided.    Follow-up: 1 year(s) in-person, or earlier for new or changing lesions    Staff and Resident: [unfilled] Patient seen and staffed with Dr. Antoine Briggs MD  Dermatology Resident PGY-2    ____________________________________________    CC: Skin Check (Katty is here for a skin check and has spots of concern on her neck she would like removed as they are getting caught on clothing and bleeding as well as causing discomfort.)    HPI:  Ms. Katty Pavon is a(n) 44 year old female who presents today as a new patient for FBSE.     Patient reports a few areas of concern around her neck.  She reports that these are skin tags, however notes that they are frequently irritated, tender, and get caught along her clothing, especially during the winter months when she is wearing turtle necks.  She reports that they have been frozen off in the past by Dr. Walker.  Otherwise, patient denies any other areas of concern or symptomatic lesions.    She reports no personal history of skin cancer.  Endorses a family history of basal cell carcinoma in her paternal grandparents.  Denies any family history of melanoma.  Reports staying out of the sun regularly, but does not wear sunscreen regularly.    Patient is otherwise feeling well, without additional skin concerns.    Labs Reviewed:  N/A    Physical Exam:  Vitals: There were no vitals taken for this visit.  SKIN: Full skin, which includes the head/face, both arms, chest, back, abdomen,both legs, genitalia and/or groin buttocks, digits and/or nails, was examined.  - There are dome shaped bright red papules on the trunk and extremities.   - Multiple regular brown pigmented macules and papules are identified on the trunk and extremities.   - There is(are) skin colored pedunculated papules with erythema on the anterior and posterior neck.   - Scattered brown macules on sun exposed areas.  - There are waxy stuck  on tan to brown papules on the trunk and extremities.  - Scattered telangiectasias over the bilateral cheeks.   - No other lesions of concern on areas examined.     Medications:  Current Outpatient Medications   Medication Sig Dispense Refill     azelaic acid (FINACIA) 15 % external gel Apply topically daily. 50 g 3     FLUoxetine (PROZAC) 40 MG capsule Take 2 capsules by mouth daily       VITAMIN D PO        No current facility-administered medications for this visit.      Past Medical History:   Patient Active Problem List   Diagnosis     OCD (obsessive compulsive disorder)     Moderate episode of recurrent major depressive disorder (H)     Past Medical History:   Diagnosis Date     Anxiety      Depression      Eczema      OCD (obsessive compulsive disorder)      Suicidal ideation 5/22/2021    Never had a plan and never had a suicide attempt.        CC Referred Self, MD  No address on file on close of this encounter.      I talked with and examined Katty Brito Savanah and I agree with the assessment and the plan. I was present for the skin tag cryotherapy.. FARIHA Schultz MD.      Again, thank you for allowing me to participate in the care of your patient.      Sincerely,    Nae Briggs MD

## 2024-12-12 NOTE — PROGRESS NOTES
I talked with and examined Katty Pavon and I agree with the assessment and the plan. I was present for the skin tag cryotherapy.. FARIHA Schultz MD.

## 2025-01-26 ENCOUNTER — HEALTH MAINTENANCE LETTER (OUTPATIENT)
Age: 45
End: 2025-01-26

## 2025-05-16 SDOH — HEALTH STABILITY: PHYSICAL HEALTH: ON AVERAGE, HOW MANY DAYS PER WEEK DO YOU ENGAGE IN MODERATE TO STRENUOUS EXERCISE (LIKE A BRISK WALK)?: 4 DAYS

## 2025-05-16 SDOH — HEALTH STABILITY: PHYSICAL HEALTH: ON AVERAGE, HOW MANY MINUTES DO YOU ENGAGE IN EXERCISE AT THIS LEVEL?: 50 MIN

## 2025-05-16 ASSESSMENT — SOCIAL DETERMINANTS OF HEALTH (SDOH): HOW OFTEN DO YOU GET TOGETHER WITH FRIENDS OR RELATIVES?: MORE THAN THREE TIMES A WEEK

## 2025-05-20 ASSESSMENT — PATIENT HEALTH QUESTIONNAIRE - PHQ9
SUM OF ALL RESPONSES TO PHQ QUESTIONS 1-9: 12
SUM OF ALL RESPONSES TO PHQ QUESTIONS 1-9: 12
10. IF YOU CHECKED OFF ANY PROBLEMS, HOW DIFFICULT HAVE THESE PROBLEMS MADE IT FOR YOU TO DO YOUR WORK, TAKE CARE OF THINGS AT HOME, OR GET ALONG WITH OTHER PEOPLE: VERY DIFFICULT

## 2025-05-20 NOTE — PROGRESS NOTES
Preventive Care Visit  [facility]  Rut Mcnair MD, Family Medicine  May 21, 2025      Assessment & Plan     Routine general medical examination at a health care facility  Mammogram and colonoscopy ordered.     Moderate episode of recurrent major depressive disorder (H)  Mixed obsessional thoughts and acts  Feels that symptoms are currently stable.     High cholesterol  Non-fasting lipid panel today.   - Lipid panel reflex to direct LDL Non-fasting; Future  - Comprehensive metabolic panel; Future  - Lipid panel reflex to direct LDL Non-fasting  - Comprehensive metabolic panel    Elevated hemoglobin  Previously elevated Hgb, when she was smoking, improved to normal today.   - CBC with platelets; Future  - CBC with platelets    Elevated TSH  History of elevated TSH - also reports fatigue, monitor today.   - TSH - Reflex to FT4; Future  - TSH - Reflex to FT4    Perimenopause  Prescribed estradiol tablets 0.5 mg and progesterone 100 mg through online pharmacy as well as DHEA. Discussed recommendation for transdermal estrogen over oral due to decreased clotting/stroke risk. Discussed no routine recommendation to add DHEA to menopausal hormone therapy. She would prefer to have Rx through PCP office and will switch to Vivelle-Dot, continue progesterone 100 mg, and stop DHEA. I discussed with the patient the risks, benefits, and alternatives to taking this medication as well as common side effects. She does worry about a transdermal patch and her OCD -- if having the patch is an issue for her OCD, we can certainly then consider switching back to oral estrogen.   - progesterone (PROMETRIUM) 100 MG capsule; Take 1 capsule (100 mg) by mouth daily.  - estradiol (VIVELLE-DOT) 0.025 MG/24HR bi-weekly patch; Place 1 patch over 96 hours onto the skin twice a week.      Counseling  Appropriate preventive services were addressed with this patient via screening, questionnaire, or discussion as appropriate for fall prevention,  nutrition, physical activity, Tobacco-use cessation, social engagement, weight loss and cognition.  Checklist reviewing preventive services available has been given to the patient.  Reviewed patient's diet, addressing concerns and/or questions.   She is at risk for psychosocial distress and has been provided with information to reduce risk.       Follow-up  Return in about 53 weeks (around 5/27/2026) for Annual Wellness Visit.    Mary Lou Alaniz is a 45 year old, presenting for the following:  Physical (Hormonal and weight concerns. Extreme fatigue./Guidance on heart burn and gas./)           HPI  Feeling exhausted.   Has gained weight since she quit smoking.   Feels like her digestion has slowed down.   Heartburn and belching. Occasional vomiting. Years ago had an endoscopy (12/13/2013 - normal).   Occasional abdominal bloating.   Triggered by coffee, alcohol.   Does feel a significant brain-gut connection.     Started menopausal hormone therapy on April. Oral estrogen 0.5 mg daily and progesterone 100 mg at bedtime. As well as DHEA 25 mg daily. She is still getting her period regularly.     Quit smoking!         5/16/2025   General Health   How would you rate your overall physical health? (!) FAIR   Feel stress (tense, anxious, or unable to sleep) To some extent   (!) STRESS CONCERN      5/16/2025   Nutrition   Three or more servings of calcium each day? Yes   Diet: Regular (no restrictions)   How many servings of fruit and vegetables per day? (!) 2-3   How many sweetened beverages each day? 0-1         5/16/2025   Exercise   Days per week of moderate/strenous exercise 4 days   Average minutes spent exercising at this level 50 min         5/16/2025   Social Factors   Frequency of gathering with friends or relatives More than three times a week   Worry food won't last until get money to buy more No   Food not last or not have enough money for food? No   Do you have housing? (Housing is defined as stable  permanent housing and does not include staying outside in a car, in a tent, in an abandoned building, in an overnight shelter, or couch-surfing.) Yes   Are you worried about losing your housing? No   Lack of transportation? No   Unable to get utilities (heat,electricity)? No         5/16/2025   Dental   Dentist two times every year? Yes           5/16/2025   Substance Use   Alcohol more than 3/day or more than 7/wk No   Do you use any other substances recreationally? No     Social History     Tobacco Use    Smoking status: Former     Current packs/day: 0.50     Types: Cigarettes    Smokeless tobacco: Never    Tobacco comments:     Pt quit as of October 2024   Vaping Use    Vaping status: Never Used   Substance Use Topics    Alcohol use: Yes     Alcohol/week: 4.2 standard drinks of alcohol     Types: 5 Standard drinks or equivalent per week     Comment: rare    Drug use: No           5/20/2024   LAST FHS-7 RESULTS   1st degree relative breast or ovarian cancer Yes   Any relative bilateral breast cancer Unknown   Any male have breast cancer Unknown   Any ONE woman have BOTH breast AND ovarian cancer Unknown   Any woman with breast cancer before 50yrs Unknown   2 or more relatives with breast AND/OR ovarian cancer Unknown   2 or more relatives with breast AND/OR bowel cancer Unknown        Mammogram Screening - Mammogram every 1-2 years updated in Health Maintenance based on mutual decision making        5/16/2025   STI Screening   New sexual partner(s) since last STI/HIV test? No     History of abnormal Pap smear: No - age 30- 64 PAP with HPV every 5 years recommended        Latest Ref Rng & Units 12/8/2022     1:12 PM 7/19/2016     2:49 PM 7/19/2016    12:00 AM   PAP / HPV   PAP  Negative for Intraepithelial Lesion or Malignancy (NILM)      PAP (Historical)    NIL    HPV 16 DNA Negative Negative  Negative     HPV 18 DNA Negative Negative  Negative     Other HR HPV Negative Negative  Negative       ASCVD Risk  "  The 10-year ASCVD risk score (Damián JANE, et al., 2019) is: 2.3%    Values used to calculate the score:      Age: 45 years      Sex: Female      Is Non- : No      Diabetic: No      Tobacco smoker: No      Systolic Blood Pressure: 133 mmHg      Is BP treated: No      HDL Cholesterol: 45 mg/dL      Total Cholesterol: 276 mg/dL        5/16/2025   Contraception/Family Planning   Questions about contraception or family planning No        Reviewed and updated as needed this visit by Provider   Tobacco   Meds     Fam Hx               Objective    Exam  /80   Pulse 69   Temp 97.4  F (36.3  C)   Ht 1.713 m (5' 7.44\")   LMP 05/11/2025   SpO2 95%   BMI 27.13 kg/m     Estimated body mass index is 27.13 kg/m  as calculated from the following:    Height as of this encounter: 1.713 m (5' 7.44\").    Weight as of 2/6/23: 79.6 kg (175 lb 8 oz).    Physical Exam  GENERAL: alert and no distress  EYES: Eyes grossly normal to inspection, PERRL and conjunctivae and sclerae normal  HENT: ear canals and TM's normal, nose and mouth without ulcers or lesions  NECK: no adenopathy, no asymmetry, masses, or scars  RESP: lungs clear to auscultation - no rales, rhonchi or wheezes  CV: regular rate and rhythm, normal S1 S2, no S3 or S4, no murmur, click or rub, no peripheral edema  ABDOMEN: soft, nontender, no hepatosplenomegaly, no masses and bowel sounds normal  MS: no gross musculoskeletal defects noted, no edema  SKIN: no suspicious lesions or rashes  NEURO: Normal strength and tone, mentation intact and speech normal  PSYCH: mentation appears normal, affect normal/bright    Results for orders placed or performed in visit on 05/21/25   CBC with platelets     Status: Normal   Result Value Ref Range    WBC Count 4.7 4.0 - 11.0 10e3/uL    RBC Count 4.69 3.80 - 5.20 10e6/uL    Hemoglobin 14.4 11.7 - 15.7 g/dL    Hematocrit 42.3 35.0 - 47.0 %    MCV 90 78 - 100 fL    MCH 30.7 26.5 - 33.0 pg    MCHC " 34.0 31.5 - 36.5 g/dL    RDW 11.6 10.0 - 15.0 %    Platelet Count 336 150 - 450 10e3/uL       Answers submitted by the patient for this visit:  Patient Health Questionnaire (Submitted on 5/20/2025)  If you checked off any problems, how difficult have these problems made it for you to do your work, take care of things at home, or get along with other people?: Very difficult  PHQ9 TOTAL SCORE: 12    Signed Electronically by: Rut Mcnair MD

## 2025-05-20 NOTE — PATIENT INSTRUCTIONS
Patient Education   Preventive Care Advice   This is general advice given by our system to help you stay healthy. However, your care team may have specific advice just for you. Please talk to your care team about your preventive care needs.  Nutrition  Eat 5 or more servings of fruits and vegetables each day.  Try wheat bread, brown rice and whole grain pasta (instead of white bread, rice, and pasta).  Get enough calcium and vitamin D. Check the label on foods and aim for 100% of the RDA (recommended daily allowance).  Lifestyle  Exercise at least 150 minutes each week  (30 minutes a day, 5 days a week).  Do muscle strengthening activities 2 days a week. These help control your weight and prevent disease.  No smoking.  Wear sunscreen to prevent skin cancer.  Have a dental exam and cleaning every 6 months.  Yearly exams  See your health care team every year to talk about:  Any changes in your health.  Any medicines your care team has prescribed.  Preventive care, family planning, and ways to prevent chronic diseases.  Shots (vaccines)   HPV shots (up to age 26), if you've never had them before.  Hepatitis B shots (up to age 59), if you've never had them before.  COVID-19 shot: Get this shot when it's due.  Flu shot: Get a flu shot every year.  Tetanus shot: Get a tetanus shot every 10 years.  Pneumococcal, hepatitis A, and RSV shots: Ask your care team if you need these based on your risk.  Shingles shot (for age 50 and up)  General health tests  Diabetes screening:  Starting at age 35, Get screened for diabetes at least every 3 years.  If you are younger than age 35, ask your care team if you should be screened for diabetes.  Cholesterol test: At age 39, start having a cholesterol test every 5 years, or more often if advised.  Bone density scan (DEXA): At age 50, ask your care team if you should have this scan for osteoporosis (brittle bones).  Hepatitis C: Get tested at least once in your life.  STIs (sexually  transmitted infections)  Before age 24: Ask your care team if you should be screened for STIs.  After age 24: Get screened for STIs if you're at risk. You are at risk for STIs (including HIV) if:  You are sexually active with more than one person.  You don't use condoms every time.  You or a partner was diagnosed with a sexually transmitted infection.  If you are at risk for HIV, ask about PrEP medicine to prevent HIV.  Get tested for HIV at least once in your life, whether you are at risk for HIV or not.  Cancer screening tests  Cervical cancer screening: If you have a cervix, begin getting regular cervical cancer screening tests starting at age 21.  Breast cancer scan (mammogram): If you've ever had breasts, begin having regular mammograms starting at age 40. This is a scan to check for breast cancer.  Colon cancer screening: It is important to start screening for colon cancer at age 45.  Have a colonoscopy test every 10 years (or more often if you're at risk) Or, ask your provider about stool tests like a FIT test every year or Cologuard test every 3 years.  To learn more about your testing options, visit:   .  For help making a decision, visit:   https://bit.ly/wn62966.  Prostate cancer screening test: If you have a prostate, ask your care team if a prostate cancer screening test (PSA) at age 55 is right for you.  Lung cancer screening: If you are a current or former smoker ages 50 to 80, ask your care team if ongoing lung cancer screenings are right for you.  For informational purposes only. Not to replace the advice of your health care provider. Copyright   2023 [facility]. All rights reserved. Clinically reviewed by the [facility]. [employer] 806609 - REV 01/24.  Learning About Stress  What is stress?     Stress is your body's response to a hard situation. Your body can have a physical, emotional, or mental response. Stress is a fact of life for most people, and it  affects everyone differently. What causes stress for you may not be stressful for someone else.  A lot of things can cause stress. You may feel stress when you go on a job interview, take a test, or run a race. This kind of short-term stress is normal and even useful. It can help you if you need to work hard or react quickly. For example, stress can help you finish an important job on time.  Long-term stress is caused by ongoing stressful situations or events. Examples of long-term stress include long-term health problems, ongoing problems at work, or conflicts in your family. Long-term stress can harm your health.  How does stress affect your health?  When you are stressed, your body responds as though you are in danger. It makes hormones that speed up your heart, make you breathe faster, and give you a burst of energy. This is called the fight-or-flight stress response. If the stress is over quickly, your body goes back to normal and no harm is done.  But if stress happens too often or lasts too long, it can have bad effects. Long-term stress can make you more likely to get sick, and it can make symptoms of some diseases worse. If you tense up when you are stressed, you may develop neck, shoulder, or low back pain. Stress is linked to high blood pressure and heart disease.  Stress also harms your emotional health. It can make you de leon, tense, or depressed. Your relationships may suffer, and you may not do well at work or school.  What can you do to manage stress?  You can try these things to help manage stress:   Do something active. Exercise or activity can help reduce stress. Walking is a great way to get started. Even everyday activities such as housecleaning or yard work can help.  Try yoga or zeyad chi. These techniques combine exercise and meditation. You may need some training at first to learn them.  Do something you enjoy. For example, listen to music or go to a movie. Practice your hobby or do volunteer  "work.  Meditate. This can help you relax, because you are not worrying about what happened before or what may happen in the future.  Do guided imagery. Imagine yourself in any setting that helps you feel calm. You can use online videos, books, or a teacher to guide you.  Do breathing exercises. For example:  From a standing position, bend forward from the waist with your knees slightly bent. Let your arms dangle close to the floor.  Breathe in slowly and deeply as you return to a standing position. Roll up slowly and lift your head last.  Hold your breath for just a few seconds in the standing position.  Breathe out slowly and bend forward from the waist.  Let your feelings out. Talk, laugh, cry, and express anger when you need to. Talking with supportive friends or family, a counselor, or a anita leader about your feelings is a healthy way to relieve stress. Avoid discussing your feelings with people who make you feel worse.  Write. It may help to write about things that are bothering you. This helps you find out how much stress you feel and what is causing it. When you know this, you can find better ways to cope.  What can you do to prevent stress?  You might try some of these things to help prevent stress:  Manage your time. This helps you find time to do the things you want and need to do.  Get enough sleep. Your body recovers from the stresses of the day while you are sleeping.  Get support. Your family, friends, and community can make a difference in how you experience stress.  Limit your news feed. Avoid or limit time on social media or news that may make you feel stressed.  Do something active. Exercise or activity can help reduce stress. Walking is a great way to get started.  Where can you learn more?  Go to https://www.[employer].net/patiented  Enter N032 in the search box to learn more about \"Learning About Stress.\"  Current as of: October 24, 2024  Content Version: 14.4 2024-2025 [employer].   Care instructions adapted under license by your healthcare professional. If you have questions about a medical condition or this instruction, always ask your healthcare professional. [employer] disclaims any warranty or liability for your use of this information.

## 2025-05-21 ENCOUNTER — OFFICE VISIT (OUTPATIENT)
Dept: FAMILY MEDICINE | Facility: CLINIC | Age: 45
End: 2025-05-21
Payer: COMMERCIAL

## 2025-05-21 VITALS
TEMPERATURE: 97.4 F | HEIGHT: 67 IN | BODY MASS INDEX: 27.13 KG/M2 | HEART RATE: 69 BPM | SYSTOLIC BLOOD PRESSURE: 133 MMHG | OXYGEN SATURATION: 95 % | DIASTOLIC BLOOD PRESSURE: 80 MMHG

## 2025-05-21 DIAGNOSIS — D58.2 ELEVATED HEMOGLOBIN: ICD-10-CM

## 2025-05-21 DIAGNOSIS — N95.1 PERIMENOPAUSE: ICD-10-CM

## 2025-05-21 DIAGNOSIS — F42.2 MIXED OBSESSIONAL THOUGHTS AND ACTS: ICD-10-CM

## 2025-05-21 DIAGNOSIS — E78.00 HIGH CHOLESTEROL: ICD-10-CM

## 2025-05-21 DIAGNOSIS — Z00.00 ROUTINE GENERAL MEDICAL EXAMINATION AT A HEALTH CARE FACILITY: Primary | ICD-10-CM

## 2025-05-21 DIAGNOSIS — Z12.11 SCREEN FOR COLON CANCER: ICD-10-CM

## 2025-05-21 DIAGNOSIS — R79.89 ELEVATED TSH: ICD-10-CM

## 2025-05-21 DIAGNOSIS — Z12.31 ENCOUNTER FOR SCREENING MAMMOGRAM FOR MALIGNANT NEOPLASM OF BREAST: ICD-10-CM

## 2025-05-21 DIAGNOSIS — F33.1 MODERATE EPISODE OF RECURRENT MAJOR DEPRESSIVE DISORDER (H): ICD-10-CM

## 2025-05-21 LAB
ERYTHROCYTE [DISTWIDTH] IN BLOOD BY AUTOMATED COUNT: 11.6 % (ref 10–15)
HCT VFR BLD AUTO: 42.3 % (ref 35–47)
HGB BLD-MCNC: 14.4 G/DL (ref 11.7–15.7)
MCH RBC QN AUTO: 30.7 PG (ref 26.5–33)
MCHC RBC AUTO-ENTMCNC: 34 G/DL (ref 31.5–36.5)
MCV RBC AUTO: 90 FL (ref 78–100)
PLATELET # BLD AUTO: 336 10E3/UL (ref 150–450)
RBC # BLD AUTO: 4.69 10E6/UL (ref 3.8–5.2)
WBC # BLD AUTO: 4.7 10E3/UL (ref 4–11)

## 2025-05-21 RX ORDER — ANTIARTHRITIC COMBINATION NO.2 900 MG
25 TABLET ORAL DAILY
COMMUNITY
Start: 2025-04-26

## 2025-05-21 RX ORDER — ESTRADIOL 0.03 MG/D
1 FILM, EXTENDED RELEASE TRANSDERMAL
Qty: 8 PATCH | Refills: 5 | Status: SHIPPED | OUTPATIENT
Start: 2025-05-22

## 2025-05-21 RX ORDER — ESTRADIOL 2 MG/1
1 TABLET ORAL DAILY
COMMUNITY
Start: 2025-04-26 | End: 2025-05-21

## 2025-05-21 RX ORDER — PROGESTERONE 100 MG/1
100 CAPSULE ORAL DAILY
Qty: 90 CAPSULE | Refills: 3 | Status: SHIPPED | OUTPATIENT
Start: 2025-05-21

## 2025-05-21 RX ORDER — ESTRADIOL 0.5 MG/1
0.5 TABLET ORAL DAILY
COMMUNITY

## 2025-05-22 ENCOUNTER — PATIENT OUTREACH (OUTPATIENT)
Dept: CARE COORDINATION | Facility: CLINIC | Age: 45
End: 2025-05-22
Payer: COMMERCIAL

## 2025-05-22 LAB
ALBUMIN SERPL BCG-MCNC: 4 G/DL (ref 3.5–5.2)
ALP SERPL-CCNC: 67 U/L (ref 40–150)
ALT SERPL W P-5'-P-CCNC: 21 U/L (ref 0–50)
ANION GAP SERPL CALCULATED.3IONS-SCNC: 9 MMOL/L (ref 7–15)
AST SERPL W P-5'-P-CCNC: 18 U/L (ref 0–45)
BILIRUB SERPL-MCNC: 0.3 MG/DL
BUN SERPL-MCNC: 13.8 MG/DL (ref 6–20)
CALCIUM SERPL-MCNC: 8.9 MG/DL (ref 8.8–10.4)
CHLORIDE SERPL-SCNC: 101 MMOL/L (ref 98–107)
CHOLEST SERPL-MCNC: 271 MG/DL
CREAT SERPL-MCNC: 0.81 MG/DL (ref 0.51–0.95)
EGFRCR SERPLBLD CKD-EPI 2021: 90 ML/MIN/1.73M2
FASTING STATUS PATIENT QL REPORTED: YES
FASTING STATUS PATIENT QL REPORTED: YES
GLUCOSE SERPL-MCNC: 108 MG/DL (ref 70–99)
HCO3 SERPL-SCNC: 28 MMOL/L (ref 22–29)
HDLC SERPL-MCNC: 41 MG/DL
LDLC SERPL CALC-MCNC: 167 MG/DL
NONHDLC SERPL-MCNC: 230 MG/DL
POTASSIUM SERPL-SCNC: 4.4 MMOL/L (ref 3.4–5.3)
PROT SERPL-MCNC: 6.6 G/DL (ref 6.4–8.3)
SODIUM SERPL-SCNC: 138 MMOL/L (ref 135–145)
T4 FREE SERPL-MCNC: 0.9 NG/DL (ref 0.9–1.7)
TRIGL SERPL-MCNC: 315 MG/DL
TSH SERPL DL<=0.005 MIU/L-ACNC: 5.74 UIU/ML (ref 0.3–4.2)

## 2025-05-23 ENCOUNTER — RESULTS FOLLOW-UP (OUTPATIENT)
Dept: FAMILY MEDICINE | Facility: CLINIC | Age: 45
End: 2025-05-23

## 2025-05-27 ENCOUNTER — ANCILLARY PROCEDURE (OUTPATIENT)
Dept: MAMMOGRAPHY | Facility: CLINIC | Age: 45
End: 2025-05-27
Attending: FAMILY MEDICINE
Payer: COMMERCIAL

## 2025-05-27 DIAGNOSIS — Z12.31 ENCOUNTER FOR SCREENING MAMMOGRAM FOR MALIGNANT NEOPLASM OF BREAST: ICD-10-CM

## 2025-05-27 PROCEDURE — 77067 SCR MAMMO BI INCL CAD: CPT | Mod: TC | Performed by: RADIOLOGY

## 2025-05-27 PROCEDURE — 77063 BREAST TOMOSYNTHESIS BI: CPT | Mod: TC | Performed by: RADIOLOGY

## 2025-05-30 ENCOUNTER — HOSPITAL ENCOUNTER (OUTPATIENT)
Dept: MAMMOGRAPHY | Facility: CLINIC | Age: 45
Discharge: HOME | End: 2025-05-30
Attending: FAMILY MEDICINE
Payer: COMMERCIAL

## 2025-05-30 DIAGNOSIS — R92.8 ABNORMAL MAMMOGRAM: ICD-10-CM

## 2025-05-30 PROCEDURE — 77066 DX MAMMO INCL CAD BI: CPT

## 2025-05-30 PROCEDURE — 76642 ULTRASOUND BREAST LIMITED: CPT | Mod: 50

## 2025-06-18 ENCOUNTER — TELEPHONE (OUTPATIENT)
Dept: FAMILY MEDICINE | Facility: CLINIC | Age: 45
End: 2025-06-18

## 2025-06-18 DIAGNOSIS — K64.4 SKIN TAG OF RECTUM: Primary | ICD-10-CM

## 2025-06-18 NOTE — TELEPHONE ENCOUNTER
Sending Colorectal Surgery referral per Dr. Mcnair.   Pt would like to have rectal skin tag removed.    Nurys Lubin, MARKN, RN  06/18/25, 2:31 PM

## 2025-06-19 ENCOUNTER — PATIENT OUTREACH (OUTPATIENT)
Dept: CARE COORDINATION | Facility: CLINIC | Age: 45
End: 2025-06-19
Payer: COMMERCIAL

## 2025-06-23 ENCOUNTER — PATIENT OUTREACH (OUTPATIENT)
Dept: CARE COORDINATION | Facility: CLINIC | Age: 45
End: 2025-06-23
Payer: COMMERCIAL

## 2025-06-26 ENCOUNTER — TELEPHONE (OUTPATIENT)
Dept: DERMATOLOGY | Facility: CLINIC | Age: 45
End: 2025-06-26
Payer: COMMERCIAL

## 2025-06-26 NOTE — TELEPHONE ENCOUNTER
6/26 Patient confirmed scheduled appointment:  Date: 12/18/25  Time: 9:20am  Visit type: Return Dermatology  Provider: Brigitte  Location: [facility]  Testing/imaging: na  Additional notes: na

## 2025-07-08 ENCOUNTER — TELEPHONE (OUTPATIENT)
Dept: GASTROENTEROLOGY | Facility: CLINIC | Age: 45
End: 2025-07-08
Payer: COMMERCIAL

## 2025-07-08 NOTE — TELEPHONE ENCOUNTER
Pre assessment completed for upcoming procedure.   (Please see previous telephone encounter notes for complete details)    Patient returned call.       Procedure details:    Procedure date 7.28.2025, approximate arrival time 1235 and facility location reviewed.   Patient is aware that endoscopy team will be calling about 2 days prior to confirm arrival time.    Designated  policy reviewed and that site requests drivers to check in and stay on campus. Instructed to have someone stay 6  hours post procedure.   *Disclaimer - please notify the [facility] RN GI staff with any  issues/concerns.    Medication review:    Medications reviewed. Please see supporting documentation below. Holding recommendations discussed (if applicable).   Fiber supplements: HOLD 7 days before procedure.      Prep for procedure:     Procedure prep instructions reviewed.        Any additional information needed:  N/A      Patient verbalized understanding and had no questions or concerns at this time.      Lima Jesus RN  Endoscopy Procedure Pre Assessment   [phone] option 3

## 2025-07-08 NOTE — TELEPHONE ENCOUNTER
Attempted to contact patient in order to complete pre assessment questions.     No answer. Left message to return call to [phone] option 3.    Callback communication sent via [employer].    Latosha Reilly LPN

## 2025-07-08 NOTE — TELEPHONE ENCOUNTER
Pre visit planning completed.      Procedure details:    Patient scheduled for Colonoscopy on 7/28/25.     Approximate arrival time: 1235. Procedure time 1320.   *Ensure patient is aware that endoscopy team will be calling about 2 days prior to procedure date to confirm arrival time as this may change.     Facility location: [facility]; [address] Floor, [place] [address]. Check in location: 2nd Floor at Surgery desk.  *Disclaimer: Drivers are to check in with patient and stay on campus during procedure.     Sedation type: Conscious sedation     Pre op exam needed? No.    Indication for procedure: Screening       Chart review:     Electronic implanted devices? No    Recent diagnosis of diverticulitis within the last 6 weeks? No      Medication review:    Diabetic? No    Anticoagulants? No    Weight loss medication/injectable? No GLP-1 medication per patient's medication list. Nursing to verify with pre-assessment call.    Other medication HOLDING recommendations:  N/A      Prep for procedure:     Bowel prep recommendation: Standard Miralax.   Due to: standard bowel prep    Procedure information and instructions sent via [employer]         France Corcoran RN  Endoscopy Procedure Pre Assessment   [phone] option 3

## 2025-07-08 NOTE — TELEPHONE ENCOUNTER
"Endoscopy Scheduling Screen    Caller: patient    Have you had any respiratory illness or flu-like symptoms in the last 10 days?  No    Patient is ACTIVE on [employer]AndPhone.  Inform patient that all appointment instructions will be sent via PhoneAndPhone.    Review patient's insurance for any non participating payor.    Ordering/Referring Provider: Rut Mcnair MD in [facility] PRIMARY [facility]ARE     (If ordering provider performs procedure, schedule with ordering provider unless otherwise instructed. )    BMI: Estimated body mass index is 27.13 kg/m  as calculated from the following:    Height as of 5/21/25: 1.713 m (5' 7.44\").    Weight as of 2/6/23: 79.6 kg (175 lb 8 oz).     Sedation Ordered  moderate sedation.   If patient BMI > 50 do not schedule in [facility].    If patient BMI > 45 do not schedule at [facility].    Are you taking methadone or Suboxone?  NO, No RN review required.    Have you been diagnosed and are being treated for severe PTSD or severe anxiety?  NO, No RN review required.    Are you taking any prescription medications for pain 3 or more times per week?   NO, No RN review required.    Do you have a history of malignant hyperthermia?  No    (Females) Are you currently pregnant?   No     Have you been diagnosed or told you have pulmonary hypertension?   No    Do you have an LVAD?  No    Have you been told you have moderate to severe sleep apnea?  No.    Have you been told you have COPD, asthma, or any other lung disease?  No    Has your doctor ordered any cardiac tests like echo, angiogram, stress test, ablation, or EKG, that you have not completed yet?  No    Do you  have a history of any heart conditions?  No     Have you ever had or are you waiting for an organ transplant?  No. Continue scheduling, no site restrictions.    Have you had a stroke or transient ischemic attack (TIA aka \"mini stroke\") in the last 2 years?   No.    Have you been diagnosed with or been told you have cirrhosis of the liver?   No.    Are you " "currently on dialysis?   No    Do you need assistance transferring?   No    BMI: Estimated body mass index is 27.13 kg/m  as calculated from the following:    Height as of 5/21/25: 1.713 m (5' 7.44\").    Weight as of 2/6/23: 79.6 kg (175 lb 8 oz).     Is patients BMI > 40 and scheduling location UP?  No    Do you take an injectable or oral medication for weight loss or diabetes (excluding insulin)?  No    Do you take the medication Naltrexone?  No    Do you take blood thinners?  No       Prep   Are you currently on dialysis or do you have chronic kidney disease?  No    Do you have a diagnosis of diabetes?  No    Do you have a diagnosis of cystic fibrosis (CF)?  No    On a regular basis do you go 3 -5 days between bowel movements?  No    BMI > 40?  No    Preferred Pharmacy:    [id number] - [place], [place] - [address] AT [facility] [address]  [address]  [facility] [address]  Phone: [phone] Fax: [phone]    Final Scheduling Details     Procedure scheduled  Colonoscopy    Surgeon:  RUBI     Date of procedure:  7/28     Pre-OP / PAC:   No - Not required for this site.    Location  [facility] - [facility] - Patient preference.    Sedation   Moderate Sedation - Per order.      Patient Reminders:   You will receive a call from a Nurse to review instructions and health history.  This assessment must be completed prior to your procedure.  Failure to complete the Nurse assessment may result in the procedure being cancelled.      On the day of your procedure, please designate an adult(s) who can drive you home stay with you for the next 24 hours. The medicines used in the exam will make you sleepy. You will not be able to drive.      You cannot take public transportation, ride share services, or non-medical taxi service without a responsible caregiver.  Medical transport services are allowed with the requirement that a responsible caregiver will receive you at your destination.  We " require that drivers and caregivers are confirmed prior to your procedure.

## 2025-07-08 NOTE — TELEPHONE ENCOUNTER
Patient has order for colonoscopy and for a colorectal procedure, patient inquired if both procedures could be completed together. Patient states she had already contacted Colorectal and was instructed to contact Endoscopy Scheduling.    Message sent to Flor Harrison and Alise Kumar if procedures could be coordinated. Writer to contact patient once response is received.

## 2025-07-16 ENCOUNTER — PRE VISIT (OUTPATIENT)
Dept: SURGERY | Facility: CLINIC | Age: 45
End: 2025-07-16
Payer: COMMERCIAL

## 2025-07-16 NOTE — CONFIDENTIAL NOTE
COLON AND RECTAL SURGERY PRE-VISIT:  Diagnosis, Referred by & from: Anal skin tags.   Appt date: 8/8/2025 with MATILDE Macdonald at [facility]     Records Requested       Record  Facility Outcome:   None   07/16/25 at 2:15 PM:  All relevant records are bookmarked under Bambi Ruiz, EMT.  No records to request at this time. PRE-VISIT COMPLETE.    Complete [x]       AVELINO LizT  Colon and Rectal Surgery

## 2025-07-24 ENCOUNTER — TELEPHONE (OUTPATIENT)
Dept: GASTROENTEROLOGY | Facility: CLINIC | Age: 45
End: 2025-07-24
Payer: COMMERCIAL

## 2025-07-28 ENCOUNTER — HOSPITAL ENCOUNTER (OUTPATIENT)
Facility: AMBULATORY SURGERY CENTER | Age: 45
Discharge: HOME | End: 2025-07-28
Attending: INTERNAL MEDICINE | Admitting: INTERNAL MEDICINE
Payer: COMMERCIAL

## 2025-07-28 VITALS
OXYGEN SATURATION: 99 % | SYSTOLIC BLOOD PRESSURE: 132 MMHG | TEMPERATURE: 97.1 F | RESPIRATION RATE: 16 BRPM | DIASTOLIC BLOOD PRESSURE: 65 MMHG | HEART RATE: 63 BPM

## 2025-07-28 LAB — COLONOSCOPY: NORMAL

## 2025-07-28 PROCEDURE — G8907 PT DOC NO EVENTS ON DISCHARG: HCPCS

## 2025-07-28 PROCEDURE — 45385 COLONOSCOPY W/LESION REMOVAL: CPT | Mod: PT

## 2025-07-28 PROCEDURE — 88305 TISSUE EXAM BY PATHOLOGIST: CPT | Performed by: PATHOLOGY

## 2025-07-28 PROCEDURE — G8918 PT W/O PREOP ORDER IV AB PRO: HCPCS

## 2025-07-28 RX ORDER — ONDANSETRON 2 MG/ML
4 INJECTION INTRAMUSCULAR; INTRAVENOUS EVERY 6 HOURS PRN
Status: DISCONTINUED | OUTPATIENT
Start: 2025-07-28 | End: 2025-07-29 | Stop reason: HOSPADM

## 2025-07-28 RX ORDER — FENTANYL CITRATE 50 UG/ML
INJECTION, SOLUTION INTRAMUSCULAR; INTRAVENOUS PRN
Status: DISCONTINUED | OUTPATIENT
Start: 2025-07-28 | End: 2025-07-28 | Stop reason: HOSPADM

## 2025-07-28 RX ORDER — NALOXONE HYDROCHLORIDE 0.4 MG/ML
0.2 INJECTION, SOLUTION INTRAMUSCULAR; INTRAVENOUS; SUBCUTANEOUS
Status: DISCONTINUED | OUTPATIENT
Start: 2025-07-28 | End: 2025-07-29 | Stop reason: HOSPADM

## 2025-07-28 RX ORDER — NALOXONE HYDROCHLORIDE 0.4 MG/ML
0.4 INJECTION, SOLUTION INTRAMUSCULAR; INTRAVENOUS; SUBCUTANEOUS
Status: DISCONTINUED | OUTPATIENT
Start: 2025-07-28 | End: 2025-07-29 | Stop reason: HOSPADM

## 2025-07-28 RX ORDER — ONDANSETRON 2 MG/ML
4 INJECTION INTRAMUSCULAR; INTRAVENOUS
Status: DISCONTINUED | OUTPATIENT
Start: 2025-07-28 | End: 2025-07-29 | Stop reason: HOSPADM

## 2025-07-28 RX ORDER — LIDOCAINE 40 MG/G
CREAM TOPICAL
Status: DISCONTINUED | OUTPATIENT
Start: 2025-07-28 | End: 2025-07-29 | Stop reason: HOSPADM

## 2025-07-28 RX ORDER — ONDANSETRON 4 MG/1
4 TABLET, ORALLY DISINTEGRATING ORAL EVERY 6 HOURS PRN
Status: DISCONTINUED | OUTPATIENT
Start: 2025-07-28 | End: 2025-07-29 | Stop reason: HOSPADM

## 2025-07-28 RX ORDER — FLUMAZENIL 0.1 MG/ML
0.2 INJECTION, SOLUTION INTRAVENOUS
Status: DISCONTINUED | OUTPATIENT
Start: 2025-07-28 | End: 2025-07-29 | Stop reason: HOSPADM

## 2025-07-28 RX ORDER — PROCHLORPERAZINE MALEATE 10 MG
10 TABLET ORAL EVERY 6 HOURS PRN
Status: DISCONTINUED | OUTPATIENT
Start: 2025-07-28 | End: 2025-07-29 | Stop reason: HOSPADM

## 2025-07-28 NOTE — H&P
ENDOSCOPY PRE-SEDATION H&P FOR OUTPATIENT PROCEDURES    Katty Pavon  [id number]  1980    Procedure: colonoscopy    Pre-procedure diagnosis: CRCS    Past medical history:   Past Medical History:   Diagnosis Date    Anxiety     Depression     Eczema     OCD (obsessive compulsive disorder)     Suicidal ideation 5/22/2021    Never had a plan and never had a suicide attempt.        Past surgical history:   Past Surgical History:   Procedure Laterality Date    ESOPHAGOSCOPY, GASTROSCOPY, DUODENOSCOPY (EGD), COMBINED  12/13/2013    Procedure: COMBINED ESOPHAGOSCOPY, GASTROSCOPY, DUODENOSCOPY (EGD);;  Surgeon: Qasim Kennedy MD;  Location:  GI    EXTRACTION(S) DENTAL         Current Outpatient Medications   Medication Sig Dispense Refill    azelaic acid (FINACIA) 15 % external gel Apply topically daily. 50 g 3    estradiol (VIVELLE-DOT) 0.025 MG/24HR bi-weekly patch Place 1 patch over 96 hours onto the skin twice a week. 8 patch 5    FLUoxetine (PROZAC) 40 MG capsule Take 2 capsules by mouth daily      levothyroxine (SYNTHROID/LEVOTHROID) 25 MCG tablet Take 1 tablet (25 mcg) by mouth every morning (before breakfast). 30 tablet 2    progesterone (PROMETRIUM) 100 MG capsule Take 1 capsule (100 mg) by mouth daily. 90 capsule 3    VITAMIN D PO       dhea 25 MG TABS Take 25 mg by mouth daily.      estradiol (ESTRACE) 0.5 MG tablet Take 0.5 mg by mouth daily.      PROGESTERONE 100MG CAPSULES COMPOUND Take 100 capsules by mouth at bedtime.       Current Facility-Administered Medications   Medication Dose Route Frequency Provider Last Rate Last Admin    lidocaine (LMX4) kit   Topical Q1H PRN Richi Bhandari MD        lidocaine 1 % 0.1-1 mL  0.1-1 mL Other Q1H PRN Richi Bhandari MD        ondansetron (ZOFRAN) injection 4 mg  4 mg Intravenous Once PRN Richi Bhandari MD        sodium chloride (PF) 0.9% PF flush 3 mL  3 mL Intracatheter Q8H [facility] Richi Bhandari MD         sodium chloride (PF) 0.9% PF flush 3 mL  3 mL Intracatheter q1 min prn Richi Bhandari MD           Allergies   Allergen Reactions    Apricot Flavoring Agent (Non-Screening) Hives    Flagyl [Metronidazole] Hives    Keflex [Cephalexin Monohydrate] Hives       History of Anesthesia/Sedation Problems: no    PHYSICAL EXAMINATION:  Constitutional: aaox3, cooperative, pleasant  Vitals reviewed: /59   Pulse 72   Temp 97.1  F (36.2  C)   Resp 14   LMP 07/06/2025   SpO2 97%   Wt:   Wt Readings from Last 2 Encounters:   02/06/23 79.6 kg (175 lb 8 oz)   12/08/22 76.9 kg (169 lb 8 oz)      Eyes: Sclera anicteric/injected  Ears/nose/mouth/throat: Normal oropharynx without ulcers or exudate, mucus membranes moist, hearing intact  Neck: supple, thyroid normal size  CV: No edema  Respiratory: Unlabored breathing  Lymph: No submandibular, supraclavicular or inguinal lymphadenopathy  Abd: Nondistended, no masses, nontender  Skin: warm, perfused, no jaundice  Psych: Normal affect  MSK: normal movement on limited exam.    ASA Score: See Provation note    Assessment/Plan:     The patient is an appropriate candidate to receive sedation.    Informed consent was discussed with the patient/family, including the risks, benefits, potential complications and any alternative options associated with sedation.    Patient assessment completed just prior to sedation and while under constant observation by the provider. Condition determined to be adequate for proceeding with sedation.    The specific risks for the procedure were discussed with the patient at the time of informed consent and include but are not limited to perforation which could require surgery, missing significant neoplasm or lesion, hemorrhage and adverse sedative complication.      Richi Bhandari MD

## 2025-07-30 LAB
PATH REPORT.COMMENTS IMP SPEC: NORMAL
PATH REPORT.COMMENTS IMP SPEC: NORMAL
PATH REPORT.FINAL DX SPEC: NORMAL
PATH REPORT.GROSS SPEC: NORMAL
PATH REPORT.MICROSCOPIC SPEC OTHER STN: NORMAL
PATH REPORT.RELEVANT HX SPEC: NORMAL
PHOTO IMAGE: NORMAL

## 2025-08-11 ENCOUNTER — PATIENT OUTREACH (OUTPATIENT)
Dept: GASTROENTEROLOGY | Facility: CLINIC | Age: 45
End: 2025-08-11
Payer: COMMERCIAL

## 2025-08-11 PROBLEM — D12.6 ADENOMATOUS POLYP OF COLON: Status: ACTIVE | Noted: 2025-08-11

## 2025-08-20 DIAGNOSIS — E03.8 SUBCLINICAL HYPOTHYROIDISM: ICD-10-CM

## 2025-08-21 RX ORDER — LEVOTHYROXINE SODIUM 25 UG/1
TABLET ORAL
Qty: 90 TABLET | Refills: 0 | Status: SHIPPED | OUTPATIENT
Start: 2025-08-21

## (undated) DEVICE — LIFTER SURGICAL ASCENDO SUBMUCOSAL LIFT AGENT BX00712934

## (undated) DEVICE — PAD CHUX UNDERPAD 23X24" 7136

## (undated) DEVICE — KIT ENDO FIRST STEP DISINFECTANT 200ML W/POUCH EP-4

## (undated) RX ORDER — FENTANYL CITRATE 50 UG/ML
INJECTION, SOLUTION INTRAMUSCULAR; INTRAVENOUS
Status: DISPENSED
Start: 2025-07-28